# Patient Record
Sex: FEMALE | Race: WHITE | NOT HISPANIC OR LATINO | Employment: OTHER | URBAN - METROPOLITAN AREA
[De-identification: names, ages, dates, MRNs, and addresses within clinical notes are randomized per-mention and may not be internally consistent; named-entity substitution may affect disease eponyms.]

---

## 2023-04-28 ENCOUNTER — APPOINTMENT (OUTPATIENT)
Dept: CT IMAGING | Facility: HOSPITAL | Age: 88
End: 2023-04-28
Payer: MEDICARE

## 2023-04-28 ENCOUNTER — HOSPITAL ENCOUNTER (INPATIENT)
Facility: HOSPITAL | Age: 88
LOS: 4 days | Discharge: HOME | End: 2023-05-03
Attending: EMERGENCY MEDICINE | Admitting: INTERNAL MEDICINE
Payer: MEDICARE

## 2023-04-28 DIAGNOSIS — R10.9 RIGHT SIDED ABDOMINAL PAIN: ICD-10-CM

## 2023-04-28 DIAGNOSIS — E87.6 HYPOKALEMIA: Primary | ICD-10-CM

## 2023-04-28 DIAGNOSIS — D72.829 LEUKOCYTOSIS, UNSPECIFIED TYPE: ICD-10-CM

## 2023-04-28 DIAGNOSIS — R11.2 NAUSEA AND VOMITING, UNSPECIFIED VOMITING TYPE: ICD-10-CM

## 2023-04-28 DIAGNOSIS — I10 PRIMARY HYPERTENSION: ICD-10-CM

## 2023-04-28 LAB
ALBUMIN SERPL-MCNC: 3.6 G/DL (ref 3.5–5.2)
ALBUMIN/GLOB SERPL: 1 G/DL
ALP SERPL-CCNC: 81 U/L (ref 39–117)
ALT SERPL W P-5'-P-CCNC: 18 U/L (ref 1–33)
ANION GAP SERPL CALCULATED.3IONS-SCNC: 11 MMOL/L (ref 5–15)
ANION GAP SERPL CALCULATED.3IONS-SCNC: 13 MMOL/L (ref 5–15)
AST SERPL-CCNC: 21 U/L (ref 1–32)
BACTERIA UR QL AUTO: ABNORMAL /HPF
BASOPHILS # BLD AUTO: 0.02 10*3/MM3 (ref 0–0.2)
BASOPHILS NFR BLD AUTO: 0.1 % (ref 0–1.5)
BILIRUB SERPL-MCNC: 0.7 MG/DL (ref 0–1.2)
BILIRUB UR QL STRIP: NEGATIVE
BUN BLDA-MCNC: 20 MG/DL
BUN BLDA-MCNC: 20 MG/DL (ref 8–26)
BUN SERPL-MCNC: 17 MG/DL (ref 8–23)
BUN SERPL-MCNC: 19 MG/DL (ref 8–23)
BUN/CREAT SERPL: 19.8 (ref 7–25)
BUN/CREAT SERPL: 20.4 (ref 7–25)
CA-I BLDA-SCNC: 1.15 MMOL/L (ref 1.2–1.32)
CALCIUM BLD QL: 1.15 MG/DL
CALCIUM SPEC-SCNC: 8.8 MG/DL (ref 8.2–9.6)
CALCIUM SPEC-SCNC: 8.9 MG/DL (ref 8.2–9.6)
CHLORIDE BLDA-SCNC: 89 MMOL/L (ref 98–109)
CHLORIDE BLDA-SCNC: 89 MMOL/L (ref 98–109)
CHLORIDE SERPL-SCNC: 90 MMOL/L (ref 98–107)
CHLORIDE SERPL-SCNC: 95 MMOL/L (ref 98–107)
CLARITY UR: CLEAR
CO2 BLDA-SCNC: 35 MMOL/L (ref 24–29)
CO2 SERPL-SCNC: 30 MMOL/L (ref 22–29)
CO2 SERPL-SCNC: 30 MMOL/L (ref 22–29)
COLOR UR: YELLOW
CREAT BLDA-MCNC: 0.9 MG/DL
CREAT BLDA-MCNC: 0.9 MG/DL (ref 0.6–1.3)
CREAT SERPL-MCNC: 0.86 MG/DL (ref 0.57–1)
CREAT SERPL-MCNC: 0.93 MG/DL (ref 0.57–1)
D-LACTATE SERPL-SCNC: 1.3 MMOL/L (ref 0.5–2)
DEPRECATED RDW RBC AUTO: 44.6 FL (ref 37–54)
EGFRCR SERPLBLD CKD-EPI 2021: 55.7 ML/MIN/1.73
EGFRCR SERPLBLD CKD-EPI 2021: 57.9 ML/MIN/1.73
EGFRCR SERPLBLD CKD-EPI 2021: 61.1 ML/MIN/1.73
EOSINOPHIL # BLD AUTO: 0.01 10*3/MM3 (ref 0–0.4)
EOSINOPHIL NFR BLD AUTO: 0 % (ref 0.3–6.2)
ERYTHROCYTE [DISTWIDTH] IN BLOOD BY AUTOMATED COUNT: 14.1 % (ref 12.3–15.4)
GLOBULIN UR ELPH-MCNC: 3.5 GM/DL
GLUCOSE BLDC GLUCOMTR-MCNC: 156 MG/DL (ref 70–130)
GLUCOSE BLDC GLUCOMTR-MCNC: 156 MG/DL (ref 70–130)
GLUCOSE SERPL-MCNC: 113 MG/DL (ref 65–99)
GLUCOSE SERPL-MCNC: 149 MG/DL (ref 65–99)
GLUCOSE UR STRIP-MCNC: NEGATIVE MG/DL
HCT VFR BLD AUTO: 37.4 % (ref 34–46.6)
HCT VFR BLDA CALC: 40 % (ref 38–51)
HCT VFR BLDA CALC: 40 % (ref 38–51)
HGB BLD-MCNC: 13.2 G/DL (ref 12–15.9)
HGB BLDA-MCNC: 13.6 G/DL (ref 12–17)
HGB BLDA-MCNC: 13.6 G/DL (ref 12–17)
HGB UR QL STRIP.AUTO: NEGATIVE
HYALINE CASTS UR QL AUTO: ABNORMAL /LPF
IMM GRANULOCYTES # BLD AUTO: 0.1 10*3/MM3 (ref 0–0.05)
IMM GRANULOCYTES NFR BLD AUTO: 0.5 % (ref 0–0.5)
KETONES UR QL STRIP: NEGATIVE
LEUKOCYTE ESTERASE UR QL STRIP.AUTO: NEGATIVE
LIPASE SERPL-CCNC: 16 U/L (ref 13–60)
LYMPHOCYTES # BLD AUTO: 1.05 10*3/MM3 (ref 0.7–3.1)
LYMPHOCYTES NFR BLD AUTO: 5.2 % (ref 19.6–45.3)
MAGNESIUM SERPL-MCNC: 2.2 MG/DL (ref 1.7–2.3)
MCH RBC QN AUTO: 30.5 PG (ref 26.6–33)
MCHC RBC AUTO-ENTMCNC: 35.3 G/DL (ref 31.5–35.7)
MCV RBC AUTO: 86.4 FL (ref 79–97)
MONOCYTES # BLD AUTO: 0.97 10*3/MM3 (ref 0.1–0.9)
MONOCYTES NFR BLD AUTO: 4.8 % (ref 5–12)
NEUTROPHILS NFR BLD AUTO: 18.04 10*3/MM3 (ref 1.7–7)
NEUTROPHILS NFR BLD AUTO: 89.4 % (ref 42.7–76)
NITRITE UR QL STRIP: NEGATIVE
NRBC BLD AUTO-RTO: 0 /100 WBC (ref 0–0.2)
PH UR STRIP.AUTO: 6 [PH] (ref 5–8)
PLATELET # BLD AUTO: 380 10*3/MM3 (ref 140–450)
PMV BLD AUTO: 10 FL (ref 6–12)
POTASSIUM BLDA-SCNC: 2.4 MMOL/L (ref 3.5–4.9)
POTASSIUM BLDA-SCNC: 2.4 MMOL/L (ref 3.5–4.9)
POTASSIUM SERPL-SCNC: 2.6 MMOL/L (ref 3.5–5.2)
POTASSIUM SERPL-SCNC: 3.3 MMOL/L (ref 3.5–5.2)
PROCALCITONIN SERPL-MCNC: 0.15 NG/ML (ref 0–0.25)
PROT SERPL-MCNC: 7.1 G/DL (ref 6–8.5)
PROT UR QL STRIP: ABNORMAL
RBC # BLD AUTO: 4.33 10*6/MM3 (ref 3.77–5.28)
RBC # UR STRIP: ABNORMAL /HPF
REF LAB TEST METHOD: ABNORMAL
SODIUM BLD-SCNC: 133 MMOL/L (ref 138–146)
SODIUM BLD-SCNC: 133 MMOL/L (ref 138–146)
SODIUM SERPL-SCNC: 133 MMOL/L (ref 136–145)
SODIUM SERPL-SCNC: 136 MMOL/L (ref 136–145)
SP GR UR STRIP: 1.02 (ref 1–1.03)
SQUAMOUS #/AREA URNS HPF: ABNORMAL /HPF
UROBILINOGEN UR QL STRIP: ABNORMAL
WBC # UR STRIP: ABNORMAL /HPF
WBC NRBC COR # BLD: 20.19 10*3/MM3 (ref 3.4–10.8)

## 2023-04-28 PROCEDURE — 25010000002 ONDANSETRON PER 1 MG: Performed by: INTERNAL MEDICINE

## 2023-04-28 PROCEDURE — 99285 EMERGENCY DEPT VISIT HI MDM: CPT

## 2023-04-28 PROCEDURE — 80047 BASIC METABLC PNL IONIZED CA: CPT

## 2023-04-28 PROCEDURE — 81001 URINALYSIS AUTO W/SCOPE: CPT | Performed by: EMERGENCY MEDICINE

## 2023-04-28 PROCEDURE — P9612 CATHETERIZE FOR URINE SPEC: HCPCS

## 2023-04-28 PROCEDURE — 83690 ASSAY OF LIPASE: CPT | Performed by: EMERGENCY MEDICINE

## 2023-04-28 PROCEDURE — 74176 CT ABD & PELVIS W/O CONTRAST: CPT

## 2023-04-28 PROCEDURE — 85014 HEMATOCRIT: CPT

## 2023-04-28 PROCEDURE — G0378 HOSPITAL OBSERVATION PER HR: HCPCS

## 2023-04-28 PROCEDURE — 25010000002 HYDRALAZINE PER 20 MG

## 2023-04-28 PROCEDURE — 80053 COMPREHEN METABOLIC PANEL: CPT | Performed by: EMERGENCY MEDICINE

## 2023-04-28 PROCEDURE — 99223 1ST HOSP IP/OBS HIGH 75: CPT | Performed by: INTERNAL MEDICINE

## 2023-04-28 PROCEDURE — 85025 COMPLETE CBC W/AUTO DIFF WBC: CPT | Performed by: EMERGENCY MEDICINE

## 2023-04-28 PROCEDURE — 83735 ASSAY OF MAGNESIUM: CPT | Performed by: EMERGENCY MEDICINE

## 2023-04-28 PROCEDURE — 93005 ELECTROCARDIOGRAM TRACING: CPT | Performed by: EMERGENCY MEDICINE

## 2023-04-28 PROCEDURE — 83605 ASSAY OF LACTIC ACID: CPT | Performed by: EMERGENCY MEDICINE

## 2023-04-28 PROCEDURE — 84145 PROCALCITONIN (PCT): CPT | Performed by: EMERGENCY MEDICINE

## 2023-04-28 RX ORDER — ACETAMINOPHEN 325 MG/1
650 TABLET ORAL EVERY 4 HOURS PRN
Status: DISCONTINUED | OUTPATIENT
Start: 2023-04-28 | End: 2023-05-03 | Stop reason: HOSPADM

## 2023-04-28 RX ORDER — SODIUM CHLORIDE 0.9 % (FLUSH) 0.9 %
10 SYRINGE (ML) INJECTION AS NEEDED
Status: DISCONTINUED | OUTPATIENT
Start: 2023-04-28 | End: 2023-05-03 | Stop reason: HOSPADM

## 2023-04-28 RX ORDER — PAROXETINE 10 MG/1
10 TABLET, FILM COATED ORAL EVERY MORNING
COMMUNITY

## 2023-04-28 RX ORDER — LEVOTHYROXINE SODIUM 0.07 MG/1
75 TABLET ORAL DAILY
COMMUNITY

## 2023-04-28 RX ORDER — SODIUM CHLORIDE 0.9 % (FLUSH) 0.9 %
10 SYRINGE (ML) INJECTION EVERY 12 HOURS SCHEDULED
Status: DISCONTINUED | OUTPATIENT
Start: 2023-04-28 | End: 2023-05-03 | Stop reason: HOSPADM

## 2023-04-28 RX ORDER — LOSARTAN POTASSIUM AND HYDROCHLOROTHIAZIDE 12.5; 1 MG/1; MG/1
1 TABLET ORAL DAILY
COMMUNITY
End: 2023-05-03 | Stop reason: HOSPADM

## 2023-04-28 RX ORDER — SODIUM CHLORIDE 9 MG/ML
40 INJECTION, SOLUTION INTRAVENOUS AS NEEDED
Status: DISCONTINUED | OUTPATIENT
Start: 2023-04-28 | End: 2023-05-03 | Stop reason: HOSPADM

## 2023-04-28 RX ORDER — LATANOPROST 50 UG/ML
1 SOLUTION/ DROPS OPHTHALMIC NIGHTLY
Status: DISCONTINUED | OUTPATIENT
Start: 2023-04-28 | End: 2023-05-03 | Stop reason: HOSPADM

## 2023-04-28 RX ORDER — LATANOPROST 50 UG/ML
1 SOLUTION/ DROPS OPHTHALMIC NIGHTLY
COMMUNITY
End: 2023-05-03 | Stop reason: HOSPADM

## 2023-04-28 RX ORDER — ONDANSETRON 2 MG/ML
4 INJECTION INTRAMUSCULAR; INTRAVENOUS EVERY 6 HOURS PRN
Status: DISCONTINUED | OUTPATIENT
Start: 2023-04-28 | End: 2023-05-03 | Stop reason: HOSPADM

## 2023-04-28 RX ORDER — POTASSIUM CHLORIDE 1.5 G/1.77G
40 POWDER, FOR SOLUTION ORAL EVERY 4 HOURS
Status: DISCONTINUED | OUTPATIENT
Start: 2023-04-29 | End: 2023-04-29

## 2023-04-28 RX ORDER — SODIUM CHLORIDE 9 MG/ML
100 INJECTION, SOLUTION INTRAVENOUS CONTINUOUS
Status: DISCONTINUED | OUTPATIENT
Start: 2023-04-28 | End: 2023-04-29

## 2023-04-28 RX ORDER — LOSARTAN POTASSIUM 50 MG/1
100 TABLET ORAL
Status: DISCONTINUED | OUTPATIENT
Start: 2023-04-29 | End: 2023-04-28

## 2023-04-28 RX ORDER — LOSARTAN POTASSIUM 50 MG/1
100 TABLET ORAL
Status: DISCONTINUED | OUTPATIENT
Start: 2023-04-28 | End: 2023-05-02

## 2023-04-28 RX ORDER — AMLODIPINE BESYLATE 5 MG/1
5 TABLET ORAL DAILY
COMMUNITY
End: 2023-05-03 | Stop reason: HOSPADM

## 2023-04-28 RX ORDER — PAROXETINE 10 MG/1
10 TABLET, FILM COATED ORAL DAILY
Status: DISCONTINUED | OUTPATIENT
Start: 2023-04-29 | End: 2023-05-03 | Stop reason: HOSPADM

## 2023-04-28 RX ORDER — AMLODIPINE BESYLATE 5 MG/1
5 TABLET ORAL DAILY
Status: DISCONTINUED | OUTPATIENT
Start: 2023-04-29 | End: 2023-05-01

## 2023-04-28 RX ORDER — LEVOTHYROXINE SODIUM 0.07 MG/1
75 TABLET ORAL DAILY
Status: DISCONTINUED | OUTPATIENT
Start: 2023-04-29 | End: 2023-05-03 | Stop reason: HOSPADM

## 2023-04-28 RX ORDER — POTASSIUM CHLORIDE 1.5 G/1.77G
40 POWDER, FOR SOLUTION ORAL ONCE
Status: COMPLETED | OUTPATIENT
Start: 2023-04-28 | End: 2023-04-28

## 2023-04-28 RX ORDER — HYDRALAZINE HYDROCHLORIDE 20 MG/ML
10 INJECTION INTRAMUSCULAR; INTRAVENOUS EVERY 6 HOURS PRN
Status: DISCONTINUED | OUTPATIENT
Start: 2023-04-28 | End: 2023-04-29

## 2023-04-28 RX ADMIN — POTASSIUM CHLORIDE 40 MEQ: 1.5 POWDER, FOR SOLUTION ORAL at 17:31

## 2023-04-28 RX ADMIN — ONDANSETRON 4 MG: 2 INJECTION INTRAMUSCULAR; INTRAVENOUS at 22:38

## 2023-04-28 RX ADMIN — SODIUM CHLORIDE 100 ML/HR: 9 INJECTION, SOLUTION INTRAVENOUS at 22:44

## 2023-04-28 RX ADMIN — LATANOPROST 1 DROP: 50 SOLUTION OPHTHALMIC at 23:47

## 2023-04-28 RX ADMIN — HYDRALAZINE HYDROCHLORIDE 10 MG: 20 INJECTION INTRAMUSCULAR; INTRAVENOUS at 23:46

## 2023-04-28 RX ADMIN — SODIUM CHLORIDE, POTASSIUM CHLORIDE, SODIUM LACTATE AND CALCIUM CHLORIDE 1000 ML: 600; 310; 30; 20 INJECTION, SOLUTION INTRAVENOUS at 19:04

## 2023-04-28 RX ADMIN — ACETAMINOPHEN 325MG 650 MG: 325 TABLET ORAL at 22:38

## 2023-04-28 RX ADMIN — POTASSIUM CHLORIDE 40 MEQ: 1.5 POWDER, FOR SOLUTION ORAL at 23:47

## 2023-04-28 RX ADMIN — Medication 10 ML: at 22:45

## 2023-04-28 RX ADMIN — LOSARTAN POTASSIUM 100 MG: 50 TABLET, FILM COATED ORAL at 22:38

## 2023-04-28 NOTE — ED PROVIDER NOTES
Subjective   History of Present Illness    Pt presents after referral from PCP for abnormal labs.  She was seen this morning for a 2-3 day history of fatigue, right sided abdominal pain, nausea.  She has vomited a couple of times but family says not a lot.  No fever or urinary complaints.  This morning they saw PCP at the [facility] and were told she had elevated WBC and low potassium and they were sent in.  Pt says overall she just feels terrible.    PMH HTN, thyroid disease, GERD, fibromyalgia.  Her HTN med does include HCTZ 12.5 mg.  She says she has been told she has some sort of abdominal mass but they don't know any details.    History provided by:  Patient and relative      Review of Systems   Constitutional: Negative for fever.   Respiratory: Negative for shortness of breath.    Cardiovascular: Negative for chest pain.   Gastrointestinal: Positive for abdominal pain, nausea and vomiting. Negative for diarrhea.   Genitourinary: Negative for difficulty urinating and dysuria.   All other systems reviewed and are negative.      Past Medical History:   Diagnosis Date   • Breast cancer    • Fibromyalgia    • Hypertension        Allergies   Allergen Reactions   • Codeine GI Intolerance       History reviewed. No pertinent surgical history.    History reviewed. No pertinent family history.    Social History     Socioeconomic History   • Marital status:    Tobacco Use   • Smoking status: Never   Vaping Use   • Vaping Use: Never used   Substance and Sexual Activity   • Alcohol use: Never   • Drug use: Never   • Sexual activity: Not Currently           Objective   Physical Exam  Vitals and nursing note reviewed.   Constitutional:       General: She is not in acute distress.     Appearance: Normal appearance. She is not ill-appearing.   HENT:      Head: Normocephalic and atraumatic.      Mouth/Throat:      Mouth: Mucous membranes are moist.   Eyes:      General: No scleral icterus.        Right eye: No  discharge.         Left eye: No discharge.      Conjunctiva/sclera: Conjunctivae normal.   Cardiovascular:      Rate and Rhythm: Normal rate and regular rhythm.      Heart sounds: No murmur heard.  Pulmonary:      Effort: Pulmonary effort is normal. No respiratory distress.      Breath sounds: Normal breath sounds. No wheezing.   Abdominal:      General: Bowel sounds are normal. There is no distension.      Palpations: Abdomen is soft.      Tenderness: There is abdominal tenderness (diffuse). There is no guarding or rebound.   Musculoskeletal:         General: No swelling. Normal range of motion.      Cervical back: Normal range of motion and neck supple.   Skin:     General: Skin is warm and dry.      Findings: No rash.   Neurological:      General: No focal deficit present.      Mental Status: She is alert and oriented to person, place, and time. Mental status is at baseline.   Psychiatric:         Mood and Affect: Mood normal.         Behavior: Behavior normal.         Thought Content: Thought content normal.         Procedures           ED Course  ED Course as of 04/28/23 2028 Fri Apr 28, 2023   1610 EKG NSR, LAD, RBBB with QTc 482 [BW]   1615 I have no prior EKG for comparison [BW]   1615 Awaiting magnesium result and then replacement will be ordered as indicated. [BW]   1650 Home meds do include HCTZ [BW]      ED Course User Index  [BW] Miguel Plaza MD         Hypokalemia 2.6 here, normal magnesium.  Replacement ordered. WBC up.  Labs otherwise benign, sepsis markers are negative.    CT concerning for pancreatitis but her lipase is negative.    UA was hard to obtain and delayed her evaluation/care.  It was negative.  There is no obvious explanation for her leukocytosis, certainly stress reaction could be cause but in a 89 yr old index of suspicion for badness needs to be high.    Electrolyte replacement protocol ordered.      She feels better after meds and fluids.    Patient stable on serial  rechecks.  Discussed exam findings, test results so far and concerns in detail at the bedside.  Discussed need for admission for further evaluation and treatment.                                    Medical Decision Making  Hypokalemia: complicated acute illness or injury that poses a threat to life or bodily functions  Leukocytosis, unspecified type: undiagnosed new problem with uncertain prognosis  Nausea and vomiting, unspecified vomiting type: acute illness or injury  Right sided abdominal pain: acute illness or injury  Amount and/or Complexity of Data Reviewed  Independent Historian: caregiver  Labs: ordered. Decision-making details documented in ED Course.  Radiology: ordered. Decision-making details documented in ED Course.  ECG/medicine tests: ordered and independent interpretation performed. Decision-making details documented in ED Course.      Risk  OTC drugs.  Prescription drug management.  Decision regarding hospitalization.          Final diagnoses:   Hypokalemia   Leukocytosis, unspecified type   Right sided abdominal pain   Nausea and vomiting, unspecified vomiting type       ED Disposition  ED Disposition     ED Disposition   Decision to Admit    Condition   --    Comment   --             No follow-up provider specified.       Medication List      No changes were made to your prescriptions during this visit.          Miguel Plaza MD  04/28/23 2028

## 2023-04-28 NOTE — Clinical Note
Level of Care: Telemetry [5]   Diagnosis: Hypokalemia [489182]   Admitting Physician: FREDERIC MATHEWS III [[id number]   Attending Physician: FREDERIC MATHEWS III [[id number]   Bed Request Comments: obs tele

## 2023-04-29 ENCOUNTER — APPOINTMENT (OUTPATIENT)
Dept: GENERAL RADIOLOGY | Facility: HOSPITAL | Age: 88
End: 2023-04-29
Payer: MEDICARE

## 2023-04-29 LAB
ALBUMIN SERPL-MCNC: 3.2 G/DL (ref 3.5–5.2)
ALBUMIN/GLOB SERPL: 1 G/DL
ALP SERPL-CCNC: 83 U/L (ref 39–117)
ALT SERPL W P-5'-P-CCNC: 11 U/L (ref 1–33)
ANION GAP SERPL CALCULATED.3IONS-SCNC: 11 MMOL/L (ref 5–15)
AST SERPL-CCNC: 12 U/L (ref 1–32)
BASOPHILS # BLD AUTO: 0.04 10*3/MM3 (ref 0–0.2)
BASOPHILS NFR BLD AUTO: 0.2 % (ref 0–1.5)
BILIRUB SERPL-MCNC: 0.8 MG/DL (ref 0–1.2)
BUN SERPL-MCNC: 15 MG/DL (ref 8–23)
BUN/CREAT SERPL: 20.8 (ref 7–25)
CALCIUM SPEC-SCNC: 8.4 MG/DL (ref 8.2–9.6)
CHLORIDE SERPL-SCNC: 98 MMOL/L (ref 98–107)
CO2 SERPL-SCNC: 27 MMOL/L (ref 22–29)
CREAT SERPL-MCNC: 0.72 MG/DL (ref 0.57–1)
DEPRECATED RDW RBC AUTO: 47.4 FL (ref 37–54)
EGFRCR SERPLBLD CKD-EPI 2021: 75.7 ML/MIN/1.73
EOSINOPHIL # BLD AUTO: 0.03 10*3/MM3 (ref 0–0.4)
EOSINOPHIL NFR BLD AUTO: 0.2 % (ref 0.3–6.2)
ERYTHROCYTE [DISTWIDTH] IN BLOOD BY AUTOMATED COUNT: 14.5 % (ref 12.3–15.4)
GLOBULIN UR ELPH-MCNC: 3.2 GM/DL
GLUCOSE SERPL-MCNC: 108 MG/DL (ref 65–99)
HCT VFR BLD AUTO: 36.3 % (ref 34–46.6)
HGB BLD-MCNC: 12.3 G/DL (ref 12–15.9)
IMM GRANULOCYTES # BLD AUTO: 0.13 10*3/MM3 (ref 0–0.05)
IMM GRANULOCYTES NFR BLD AUTO: 0.7 % (ref 0–0.5)
LYMPHOCYTES # BLD AUTO: 1.03 10*3/MM3 (ref 0.7–3.1)
LYMPHOCYTES NFR BLD AUTO: 5.8 % (ref 19.6–45.3)
MAGNESIUM SERPL-MCNC: 2.1 MG/DL (ref 1.7–2.3)
MCH RBC QN AUTO: 30.4 PG (ref 26.6–33)
MCHC RBC AUTO-ENTMCNC: 33.9 G/DL (ref 31.5–35.7)
MCV RBC AUTO: 89.9 FL (ref 79–97)
MONOCYTES # BLD AUTO: 1.37 10*3/MM3 (ref 0.1–0.9)
MONOCYTES NFR BLD AUTO: 7.7 % (ref 5–12)
NEUTROPHILS NFR BLD AUTO: 15.3 10*3/MM3 (ref 1.7–7)
NEUTROPHILS NFR BLD AUTO: 85.4 % (ref 42.7–76)
NRBC BLD AUTO-RTO: 0 /100 WBC (ref 0–0.2)
PLATELET # BLD AUTO: 330 10*3/MM3 (ref 140–450)
PMV BLD AUTO: 10 FL (ref 6–12)
POTASSIUM SERPL-SCNC: 3 MMOL/L (ref 3.5–5.2)
POTASSIUM SERPL-SCNC: 3.5 MMOL/L (ref 3.5–5.2)
POTASSIUM SERPL-SCNC: 3.6 MMOL/L (ref 3.5–5.2)
PROT SERPL-MCNC: 6.4 G/DL (ref 6–8.5)
RBC # BLD AUTO: 4.04 10*6/MM3 (ref 3.77–5.28)
SODIUM SERPL-SCNC: 136 MMOL/L (ref 136–145)
WBC NRBC COR # BLD: 17.9 10*3/MM3 (ref 3.4–10.8)

## 2023-04-29 PROCEDURE — 85025 COMPLETE CBC W/AUTO DIFF WBC: CPT | Performed by: INTERNAL MEDICINE

## 2023-04-29 PROCEDURE — 84132 ASSAY OF SERUM POTASSIUM: CPT

## 2023-04-29 PROCEDURE — 83735 ASSAY OF MAGNESIUM: CPT | Performed by: INTERNAL MEDICINE

## 2023-04-29 PROCEDURE — 84132 ASSAY OF SERUM POTASSIUM: CPT | Performed by: INTERNAL MEDICINE

## 2023-04-29 PROCEDURE — 71045 X-RAY EXAM CHEST 1 VIEW: CPT

## 2023-04-29 PROCEDURE — 99233 SBSQ HOSP IP/OBS HIGH 50: CPT | Performed by: INTERNAL MEDICINE

## 2023-04-29 PROCEDURE — 80053 COMPREHEN METABOLIC PANEL: CPT | Performed by: INTERNAL MEDICINE

## 2023-04-29 PROCEDURE — 25010000002 FUROSEMIDE PER 20 MG: Performed by: INTERNAL MEDICINE

## 2023-04-29 PROCEDURE — 0 POTASSIUM CHLORIDE 10 MEQ/100ML SOLUTION: Performed by: INTERNAL MEDICINE

## 2023-04-29 RX ORDER — POTASSIUM CHLORIDE 1.5 G/1.77G
40 POWDER, FOR SOLUTION ORAL ONCE
Status: COMPLETED | OUTPATIENT
Start: 2023-04-29 | End: 2023-04-29

## 2023-04-29 RX ORDER — POTASSIUM CHLORIDE 7.45 MG/ML
10 INJECTION INTRAVENOUS
Status: DISPENSED | OUTPATIENT
Start: 2023-04-29 | End: 2023-04-29

## 2023-04-29 RX ORDER — POTASSIUM CHLORIDE 1.5 G/1.77G
40 POWDER, FOR SOLUTION ORAL EVERY 4 HOURS
Status: COMPLETED | OUTPATIENT
Start: 2023-04-29 | End: 2023-04-29

## 2023-04-29 RX ORDER — FUROSEMIDE 10 MG/ML
20 INJECTION INTRAMUSCULAR; INTRAVENOUS ONCE
Status: COMPLETED | OUTPATIENT
Start: 2023-04-29 | End: 2023-04-29

## 2023-04-29 RX ORDER — CLONIDINE 0.3 MG/24H
1 PATCH, EXTENDED RELEASE TRANSDERMAL WEEKLY
Status: DISCONTINUED | OUTPATIENT
Start: 2023-04-29 | End: 2023-05-01

## 2023-04-29 RX ORDER — LABETALOL HYDROCHLORIDE 5 MG/ML
10 INJECTION, SOLUTION INTRAVENOUS ONCE
Status: COMPLETED | OUTPATIENT
Start: 2023-04-30 | End: 2023-04-29

## 2023-04-29 RX ORDER — ENALAPRILAT 2.5 MG/2ML
1.25 INJECTION INTRAVENOUS EVERY 6 HOURS PRN
Status: DISCONTINUED | OUTPATIENT
Start: 2023-04-29 | End: 2023-04-30

## 2023-04-29 RX ADMIN — LEVOTHYROXINE SODIUM 75 MCG: 75 TABLET ORAL at 08:14

## 2023-04-29 RX ADMIN — Medication 10 ML: at 20:39

## 2023-04-29 RX ADMIN — LABETALOL HYDROCHLORIDE 10 MG: 5 INJECTION, SOLUTION INTRAVENOUS at 23:50

## 2023-04-29 RX ADMIN — AMLODIPINE BESYLATE 5 MG: 5 TABLET ORAL at 08:14

## 2023-04-29 RX ADMIN — LATANOPROST 1 DROP: 50 SOLUTION OPHTHALMIC at 20:39

## 2023-04-29 RX ADMIN — POTASSIUM CHLORIDE 40 MEQ: 1.5 POWDER, FOR SOLUTION ORAL at 14:05

## 2023-04-29 RX ADMIN — PAROXETINE HYDROCHLORIDE 10 MG: 20 TABLET, FILM COATED ORAL at 08:14

## 2023-04-29 RX ADMIN — POTASSIUM CHLORIDE 40 MEQ: 1.5 POWDER, FOR SOLUTION ORAL at 18:19

## 2023-04-29 RX ADMIN — FUROSEMIDE 20 MG: 10 INJECTION, SOLUTION INTRAMUSCULAR; INTRAVENOUS at 12:40

## 2023-04-29 RX ADMIN — CLONIDINE 1 PATCH: 0.3 PATCH, EXTENDED RELEASE TRANSDERMAL at 14:05

## 2023-04-29 RX ADMIN — ENALAPRILAT 1.25 MG: 1.25 INJECTION INTRAVENOUS at 20:39

## 2023-04-29 RX ADMIN — ENALAPRILAT 1.25 MG: 1.25 INJECTION INTRAVENOUS at 09:44

## 2023-04-29 RX ADMIN — POTASSIUM CHLORIDE 40 MEQ: 1.5 POWDER, FOR SOLUTION ORAL at 06:29

## 2023-04-29 RX ADMIN — ENALAPRILAT 1.25 MG: 1.25 INJECTION INTRAVENOUS at 00:50

## 2023-04-29 RX ADMIN — Medication 10 ML: at 08:28

## 2023-04-29 NOTE — NURSING NOTE
NSR on monitor. 2L NC. SBP elevated throughout shift, MD aware.  PRN vasotec administered. lasix and clonidine patch administered as prescribed. current /89.

## 2023-04-29 NOTE — PROGRESS NOTES
[facility] Medicine Services  PROGRESS NOTE    Patient Name: Shayna Paula  : 1924  MRN: [id number]    Date of Admission: 2023  Primary Care Physician: Riky Sue DO    Subjective   Subjective     CC:  Weakness    HPI:  Resting in bed in no acute distress but complains of weakness.  No fever or chills.  No chest pain or palpitation.  No nausea vomiting or diarrhea.  No headache.    ROS:       Objective   Objective     Vital Signs:   Temp:  [97.7 °F (36.5 °C)-98.4 °F (36.9 °C)] 98.1 °F (36.7 °C)  Heart Rate:  [] 87  Resp:  [16-20] 16  BP: (149-215)/() 169/89  Flow (L/min):  [2] 2     Physical Exam:  Constitutional: No acute distresst  HENT: NCAT, mucous membranes moist  Respiratory: Clear to auscultation bilaterally, respiratory effort normal   Cardiovascular: RRR, no murmurs, rubs, or gallops  Gastrointestinal: Positive bowel sounds, soft, nontender, nondistended  Musculoskeletal: No bilateral ankle edema  Psychiatric: Appropriate affect, cooperative  Neurologic: Awake, alert, oriented x3, speech clear  Skin: No rashes    Results Reviewed:  LAB RESULTS:      Lab 23  0508 23  1600 23  1551 23  1549   WBC 17.90*  --   --  20.19*   HEMOGLOBIN 12.3  --   --  13.2   HEMOGLOBIN, POC  --  13.6 13.6  --    HEMATOCRIT 36.3  --   --  37.4   HEMATOCRIT POC  --  40 40  --    PLATELETS 330  --   --  380   NEUTROS ABS 15.30*  --   --  18.04*   IMMATURE GRANS (ABS) 0.13*  --   --  0.10*   LYMPHS ABS 1.03  --   --  1.05   MONOS ABS 1.37*  --   --  0.97*   EOS ABS 0.03  --   --  0.01   MCV 89.9  --   --  86.4   PROCALCITONIN  --   --   --  0.15   LACTATE  --   --   --  1.3         Lab 23  1216 23  0508 23  2231 23  1600 23  1551 23  1549   SODIUM  --  136 136  --   --  133*   POTASSIUM 3.5 3.0* 3.3*  --   --  2.6*   CHLORIDE  --  98 95*  --   --  90*   CO2  --  27.0 30.0*  --   --  30.0*   ANION GAP  --   11.0 11.0  --   --  13.0   BUN  --  15 17  --   --  19   CREATININE  --  0.72 0.86 0.90 0.90 0.93   EGFR  --  75.7 61.1  --  57.9* 55.7*   GLUCOSE  --  108* 113*  --   --  149*   CALCIUM  --  8.4 8.8  --   --  8.9   MAGNESIUM  --  2.1  --   --   --  2.2         Lab 04/29/23  0508 04/28/23  1549   TOTAL PROTEIN 6.4 7.1   ALBUMIN 3.2* 3.6   GLOBULIN 3.2 3.5   ALT (SGPT) 11 18   AST (SGOT) 12 21   BILIRUBIN 0.8 0.7   ALK PHOS 83 81   LIPASE  --  16                     Brief Urine Lab Results  (Last result in the past 365 days)      Color   Clarity   Blood   Leuk Est   Nitrite   Protein   CREAT   Urine HCG        04/28/23 1938 Yellow   Clear   Negative   Negative   Negative   100 mg/dL (2+)                 Microbiology Results Abnormal     None          CT Abdomen Pelvis Without Contrast    Result Date: 4/28/2023  CT ABDOMEN PELVIS WO CONTRAST Date of Exam: 4/28/2023 5:52 PM EDT Indication: right side abd pain, vomiting, may have a mass. Comparison: None available. Technique: Axial CT images were obtained of the abdomen and pelvis without the administration of contrast. Reconstructed coronal and sagittal images were also obtained. Automated exposure control and iterative construction methods were used. Findings: The lung bases are grossly clear. Unremarkable appearance of the liver, gallbladder, bile ducts, spleen. There is hazy peripancreatic fat stranding with otherwise unremarkable appearance of the pancreas; there is thin nonorganized peripancreatic fluid along the inferior margin of the tail (series 901 image 88). Normal appearance of the adrenal glands, kidneys, ureters, and bladder. Fibroid uterus. There are small bilateral ovarian cysts measuring up to 2.8 cm on the right. There is severe sigmoid colonic diverticulosis without evidence of diverticulitis. Normal appendix and terminal ileum. No evidence of bowel obstruction. There is mild fluid distention of the stomach with fluid likely in the distal esophagus.  No abdominopelvic free fluid. No pneumoperitoneum. There is atherosclerosis of the abdominal aorta without evidence of aneurysm. No lymphadenopathy. The body wall soft tissues are unremarkable. The bones are demineralized without acute or suspicious bony findings.     Impression: Impression: Hazy peripancreatic fat stranding and thin nonorganized peripancreatic fluid, findings compatible with acute interstitial pancreatitis and correlate with lipase. Mild fluid distention of the stomach with some fluid in the distal esophagus which predisposes the patient to aspiration. Fibroid uterus. Sigmoid colonic diverticulosis without diverticulitis. Electronically Signed: Joaquin Rubalcava  4/28/2023 6:14 PM EDT  Workstation ID: [id number]    XR Chest 1 View    Result Date: 4/29/2023  EXAM:  XR CHEST 1 VW   DATE: 4/29/2023 3:45 AM HISTORY:   Shortness of air COMPARISON:  None. FINDINGS and     Impression: 1.  Low lung volumes. 2.  Linear opacity right midlung (scarring, atelectasis) 3.  Question right lung base opacity versus elevated diaphragm. 4.  Heart size is normal. 5.  No acute bony findings. Electronically signed by:  Kathrine Calderon M.D.  4/29/2023 3:03 AM Mountain Time          Current medications:  Scheduled Meds:amLODIPine, 5 mg, Oral, Daily  cloNIDine, 1 patch, Transdermal, Weekly  latanoprost, 1 drop, Both Eyes, Nightly  levothyroxine, 75 mcg, Oral, Daily  losartan, 100 mg, Oral, Q24H  PARoxetine, 10 mg, Oral, Daily  potassium chloride, 40 mEq, Oral, Q4H  sodium chloride, 10 mL, Intravenous, Q12H      Continuous Infusions:   PRN Meds:.•  acetaminophen  •  enalaprilat  •  Magnesium Standard Dose Replacement - Follow Nurse / BPA Driven Protocol  •  ondansetron  •  Potassium Replacement - Follow Nurse / BPA Driven Protocol  •  sodium chloride  •  sodium chloride    Assessment & Plan   Assessment & Plan     Active Hospital Problems    Diagnosis  POA   • **Hypokalemia [E87.6]  Yes      Resolved Hospital Problems   No  resolved problems to display.        Brief Hospital Course to date:  Shayna Paula is a 89 y.o. female with past medical history significant for hypertension, glucoma, history of breast cancer, hypothyroidism, GERD.  Patient was admitted for progressive generalized weakness and hypokalemia.    *Hypokalemia  - Replace per protocol and monitor.     Hypertension  - home losartan and Norvasc was continued.  However, patient still has elevated blood pressure.  We started patient on clonidine patch.  We will continue monitoring.  - We will also continue on hydrochlorothiazide when appropriate.     Hypothyroidism  - Continue Synthroid from home regimen.     History of breast cancer  - No acute issues; treatment for this is complete.     Glaucoma  - Continue home Xalatan drops.     GERD  - We will add daily oral Pepcid.      Expected Discharge Location and Transportation: Home  Expected Discharge 2 to 3 days  No data recorded     DVT prophylaxis:  Mechanical DVT prophylaxis orders are present.          CODE STATUS:   Code Status and Medical Interventions:   Ordered at: 04/28/23 1564     Level Of Support Discussed With:    Patient     Code Status (Patient has no pulse and is not breathing):    No CPR (Do Not Attempt to Resuscitate)     Medical Interventions (Patient has pulse or is breathing):    Full Support     Comments:    DNR/DNI in the event of respiratory and/or cardiac arrest.       Codey Arguelles MD  04/29/23

## 2023-04-29 NOTE — H&P
[facility] Medicine Services  HISTORY AND PHYSICAL    Patient Name: Shayna Paula  : 1924  MRN: [id number]  Primary Care Physician: Riky Sue DO  Date of admission: 2023      Subjective   Subjective     Chief Complaint:  Weakness, fatigue    HPI:  Shayna Paula is a 89 y.o. female who states that for the last 3 days she has noticed increased lethargy and weakness, also increased fatigue.  Yesterday she had 2 bouts of nausea with emesis.  The patient's daughter-in-law is with her in the ED room during my visit and she states also that the patient has had such fatigue that she is less interactive over the last 3 days, though there was some improvement today; she states the patient seems much better after treatment in the ED.  The patient went to see her PCP today who akhil routine labs which returned a white count of 20 and potassium of 2.3.  She was instructed to come to the ED for further evaluation.  Her labs demonstrated continued hypokalemia and she received oral replacement here.  She denies any muscle spasms, palpitations, chest pain, shortness of breath, fever/chills, or syncope.  Her medical history is significant for hypertension, glaucoma, breast cancer (treatment complete), GERD, and hypothyroidism.        Review of Systems   Constitutional: Positive for fatigue.   HENT: Negative.    Eyes: Negative.    Respiratory: Negative.    Cardiovascular: Negative.    Gastrointestinal: Positive for nausea and vomiting.   Endocrine: Negative.    Genitourinary: Negative.    Musculoskeletal: Negative.    Skin: Negative.    Allergic/Immunologic: Negative.    Neurological: Positive for weakness.   Hematological: Negative.    Psychiatric/Behavioral:        As in HPI          Personal History     Past Medical History:   Diagnosis Date   • Breast cancer    • Fibromyalgia    • Hypertension              History reviewed. No pertinent surgical history.    Family History:  Mother had dementia, father  of cancer.    Social History:  reports that she has never smoked. She does not have any smokeless tobacco history on file. She reports that she does not drink alcohol and does not use drugs.  Social History     Social History Narrative   • Not on file       Medications:  Available home medication information reviewed.  (Not in a hospital admission)      Allergies   Allergen Reactions   • Codeine GI Intolerance       Objective   Objective     Vital Signs:   Temp:  [98.7 °F (37.1 °C)] 98.7 °F (37.1 °C)  Heart Rate:  [73-79] 75  Resp:  [22] 22  BP: (139-203)/() 203/94  Flow (L/min):  [2] 2       Physical Exam   Constitutional: Awake, alert  Eyes: PERRLA, sclerae anicteric, no conjunctival injection  HENT: NCAT, mucous membranes moist  Neck: Supple, no thyromegaly, no lymphadenopathy, trachea midline  Respiratory: Clear to auscultation bilaterally, nonlabored respirations   Cardiovascular: RRR, no murmurs, rubs, or gallops, palpable pedal pulses bilaterally  Gastrointestinal: Positive bowel sounds, soft, nontender, nondistended  Musculoskeletal: No bilateral ankle edema, no clubbing or cyanosis to extremities  Psychiatric: Appropriate affect, cooperative  Neurologic: Oriented x 3, strength symmetric in all extremities, Cranial Nerves grossly intact to confrontation, speech clear  Skin: No rashes        Result Review:  I have personally reviewed the results from the time of this admission to 2023 21:56 EDT and agree with these findings:  [x]  Laboratory list / accordion  []  Microbiology  [x]  Radiology  [x]  EKG/Telemetry   []  Cardiology/Vascular   []  Pathology  []  Old records  []  Other:  Most notable findings include: Reviewed CT abdomen/pelvis radiology read.  Reviewed EKG which by my read shows normal sinus rhythm, ventricular rate approximately 70 bpm,  left axis, nonspecific ST/T wave changes.        LAB RESULTS:      Lab 23  1600 23  1551 23  1549    WBC  --   --  20.19*   HEMOGLOBIN  --   --  13.2   HEMOGLOBIN, POC 13.6 13.6  --    HEMATOCRIT  --   --  37.4   HEMATOCRIT POC 40 40  --    PLATELETS  --   --  380   NEUTROS ABS  --   --  18.04*   IMMATURE GRANS (ABS)  --   --  0.10*   LYMPHS ABS  --   --  1.05   MONOS ABS  --   --  0.97*   EOS ABS  --   --  0.01   MCV  --   --  86.4   PROCALCITONIN  --   --  0.15   LACTATE  --   --  1.3         Lab 04/28/23  1600 04/28/23  1551 04/28/23  1549   SODIUM  --   --  133*   POTASSIUM  --   --  2.6*   CHLORIDE  --   --  90*   CO2  --   --  30.0*   ANION GAP  --   --  13.0   BUN  --   --  19   CREATININE 0.90 0.90 0.93   EGFR  --  57.9* 55.7*   GLUCOSE  --   --  149*   CALCIUM  --   --  8.9   MAGNESIUM  --   --  2.2         Lab 04/28/23  1549   TOTAL PROTEIN 7.1   ALBUMIN 3.6   GLOBULIN 3.5   ALT (SGPT) 18   AST (SGOT) 21   BILIRUBIN 0.7   ALK PHOS 81   LIPASE 16                     UA        4/28/2023    19:38   Urinalysis   Squamous Epithelial Cells, UA 3-6     Specific Gravity, UA 1.021     Ketones, UA Negative     Blood, UA Negative     Leukocytes, UA Negative     Nitrite, UA Negative     RBC, UA 0-2     WBC, UA 3-5     Bacteria, UA 1+         Microbiology Results (last 10 days)     ** No results found for the last 240 hours. **          CT Abdomen Pelvis Without Contrast    Result Date: 4/28/2023  CT ABDOMEN PELVIS WO CONTRAST Date of Exam: 4/28/2023 5:52 PM EDT Indication: right side abd pain, vomiting, may have a mass. Comparison: None available. Technique: Axial CT images were obtained of the abdomen and pelvis without the administration of contrast. Reconstructed coronal and sagittal images were also obtained. Automated exposure control and iterative construction methods were used. Findings: The lung bases are grossly clear. Unremarkable appearance of the liver, gallbladder, bile ducts, spleen. There is hazy peripancreatic fat stranding with otherwise unremarkable appearance of the pancreas; there is thin  nonorganized peripancreatic fluid along the inferior margin of the tail (series 901 image 88). Normal appearance of the adrenal glands, kidneys, ureters, and bladder. Fibroid uterus. There are small bilateral ovarian cysts measuring up to 2.8 cm on the right. There is severe sigmoid colonic diverticulosis without evidence of diverticulitis. Normal appendix and terminal ileum. No evidence of bowel obstruction. There is mild fluid distention of the stomach with fluid likely in the distal esophagus. No abdominopelvic free fluid. No pneumoperitoneum. There is atherosclerosis of the abdominal aorta without evidence of aneurysm. No lymphadenopathy. The body wall soft tissues are unremarkable. The bones are demineralized without acute or suspicious bony findings.     Impression: Impression: Hazy peripancreatic fat stranding and thin nonorganized peripancreatic fluid, findings compatible with acute interstitial pancreatitis and correlate with lipase. Mild fluid distention of the stomach with some fluid in the distal esophagus which predisposes the patient to aspiration. Fibroid uterus. Sigmoid colonic diverticulosis without diverticulitis. Electronically Signed: Joaquin Rubalcava  4/28/2023 6:14 PM EDT  Workstation ID: [id number]          Assessment & Plan   Assessment & Plan     Active Hospital Problems    Diagnosis  POA   • **Hypokalemia [E87.6]  Yes       89F with hypokalemia, generalized weakness, fatigue    Hypokalemia  - This was addressed in the ED with oral replacement.  - She is on electrolyte replacement protocol.  - We will recheck potassium level later on tonight.  - Continue telemetry monitoring.  - Hold HCTZ from home regimen.    Hypertension  - Continue home losartan from home regimen.  - Hold HCTZ while receiving IV fluids.  - Continue Norvasc for management.    Hypothyroidism  - Continue Synthroid from home regimen.  - Check TSH, free T4.    History of breast cancer  - No acute issues; treatment for this is  complete.    Glaucoma  - Continue home Xalatan drops.    GERD  - We will add daily oral Pepcid.      Total time spent: 79 minutes  Time spent includes time reviewing chart, face-to-face time, counseling patient/family/caregiver, ordering medications/tests/procedures, communicating with other health care professionals, documenting clinical information in the electronic health record, and coordination of care.    DVT prophylaxis:  scds      CODE STATUS:  full  Code Status and Medical Interventions:   Ordered at: 04/28/23 2029     Level Of Support Discussed With:    Patient     Code Status (Patient has no pulse and is not breathing):    CPR (Attempt to Resuscitate)     Medical Interventions (Patient has pulse or is breathing):    Full Support       Expected Discharge tbd  No data recorded     Raman Ordoñez III, DO  04/28/23

## 2023-04-30 LAB — POTASSIUM SERPL-SCNC: 3.5 MMOL/L (ref 3.5–5.2)

## 2023-04-30 PROCEDURE — 99232 SBSQ HOSP IP/OBS MODERATE 35: CPT | Performed by: INTERNAL MEDICINE

## 2023-04-30 PROCEDURE — 84132 ASSAY OF SERUM POTASSIUM: CPT | Performed by: INTERNAL MEDICINE

## 2023-04-30 RX ORDER — CLONIDINE HYDROCHLORIDE 0.1 MG/1
0.1 TABLET ORAL ONCE
Status: COMPLETED | OUTPATIENT
Start: 2023-04-30 | End: 2023-04-30

## 2023-04-30 RX ORDER — POTASSIUM CHLORIDE 1.5 G/1.77G
40 POWDER, FOR SOLUTION ORAL EVERY 4 HOURS
Status: DISPENSED | OUTPATIENT
Start: 2023-04-30 | End: 2023-05-01

## 2023-04-30 RX ORDER — LOSARTAN POTASSIUM 50 MG/1
100 TABLET ORAL ONCE
Status: COMPLETED | OUTPATIENT
Start: 2023-04-30 | End: 2023-04-30

## 2023-04-30 RX ORDER — POTASSIUM CHLORIDE 1.5 G/1.77G
40 POWDER, FOR SOLUTION ORAL EVERY 4 HOURS
Status: COMPLETED | OUTPATIENT
Start: 2023-04-30 | End: 2023-04-30

## 2023-04-30 RX ADMIN — Medication 10 ML: at 22:30

## 2023-04-30 RX ADMIN — CLONIDINE HYDROCHLORIDE 0.1 MG: 0.1 TABLET ORAL at 00:39

## 2023-04-30 RX ADMIN — Medication 10 ML: at 08:53

## 2023-04-30 RX ADMIN — POTASSIUM CHLORIDE 40 MEQ: 1.5 POWDER, FOR SOLUTION ORAL at 06:35

## 2023-04-30 RX ADMIN — LEVOTHYROXINE SODIUM 75 MCG: 75 TABLET ORAL at 08:53

## 2023-04-30 RX ADMIN — PAROXETINE HYDROCHLORIDE 10 MG: 20 TABLET, FILM COATED ORAL at 08:53

## 2023-04-30 RX ADMIN — POTASSIUM CHLORIDE 40 MEQ: 1.5 POWDER, FOR SOLUTION ORAL at 21:00

## 2023-04-30 RX ADMIN — ACETAMINOPHEN 325MG 650 MG: 325 TABLET ORAL at 00:39

## 2023-04-30 RX ADMIN — POTASSIUM CHLORIDE 40 MEQ: 1.5 POWDER, FOR SOLUTION ORAL at 10:49

## 2023-04-30 RX ADMIN — LATANOPROST 1 DROP: 50 SOLUTION OPHTHALMIC at 22:30

## 2023-04-30 RX ADMIN — LOSARTAN POTASSIUM 100 MG: 50 TABLET, FILM COATED ORAL at 00:39

## 2023-04-30 NOTE — NURSING NOTE
Pt blood pressures consistently elevated, Vasotec administered early in shift. Pt BP rising to 190s-200s, IV labetalol ordered. IV infiltrated before it could be administered. Charge RN attempted US guided IV, unable to obtain access as patient refused further pokes. Lungs sounds crackly at the bases, Hospitalist aware, PO clonidine and losartan x1 ordered.

## 2023-04-30 NOTE — PROGRESS NOTES
[facility] Medicine Services  PROGRESS NOTE    Patient Name: Shayna Paula  : 1924  MRN: [id number]    Date of Admission: 2023  Primary Care Physician: Riky Sue DO    Subjective   Subjective     CC:  Weakness    HPI:  Resting in bed in no acute distress but is very drowsy, however, easily arousable.  No fever or chills.  No chest pain or palpitation.  No nausea vomiting or diarrhea.  No headache.    ROS:       Objective   Objective     Vital Signs:   Temp:  [97.5 °F (36.4 °C)-98.3 °F (36.8 °C)] 97.6 °F (36.4 °C)  Heart Rate:  [] 65  Resp:  [16-18] 18  BP: (108-203)/() 149/86  Flow (L/min):  [2] 2     Physical Exam:  Constitutional: No acute distresst  HENT: NCAT, mucous membranes moist  Respiratory: Clear to auscultation bilaterally, respiratory effort normal   Cardiovascular: RRR, no murmurs, rubs, or gallops  Gastrointestinal: Positive bowel sounds, soft, nontender, nondistended  Musculoskeletal: No bilateral ankle edema  Psychiatric: Appropriate affect, cooperative  Neurologic: Awake, alert, oriented x3, speech clear  Skin: No rashes    Results Reviewed:  LAB RESULTS:      Lab 23  0508 23  1600 23  1551 23  1549   WBC 17.90*  --   --  20.19*   HEMOGLOBIN 12.3  --   --  13.2   HEMOGLOBIN, POC  --  13.6 13.6  --    HEMATOCRIT 36.3  --   --  37.4   HEMATOCRIT POC  --  40 40  --    PLATELETS 330  --   --  380   NEUTROS ABS 15.30*  --   --  18.04*   IMMATURE GRANS (ABS) 0.13*  --   --  0.10*   LYMPHS ABS 1.03  --   --  1.05   MONOS ABS 1.37*  --   --  0.97*   EOS ABS 0.03  --   --  0.01   MCV 89.9  --   --  86.4   PROCALCITONIN  --   --   --  0.15   LACTATE  --   --   --  1.3         Lab 23  1408 23  2213 23  1216 23  0508 23  2231 23  1600 23  1551 23  1549   SODIUM  --   --   --  136 136  --   --  133*   POTASSIUM 3.5 3.6 3.5 3.0* 3.3*  --   --  2.6*   CHLORIDE  --   --   --   98 95*  --   --  90*   CO2  --   --   --  27.0 30.0*  --   --  30.0*   ANION GAP  --   --   --  11.0 11.0  --   --  13.0   BUN  --   --   --  15 17  --   --  19   CREATININE  --   --   --  0.72 0.86 0.90 0.90 0.93   EGFR  --   --   --  75.7 61.1  --  57.9* 55.7*   GLUCOSE  --   --   --  108* 113*  --   --  149*   CALCIUM  --   --   --  8.4 8.8  --   --  8.9   MAGNESIUM  --   --   --  2.1  --   --   --  2.2         Lab 04/29/23  0508 04/28/23  1549   TOTAL PROTEIN 6.4 7.1   ALBUMIN 3.2* 3.6   GLOBULIN 3.2 3.5   ALT (SGPT) 11 18   AST (SGOT) 12 21   BILIRUBIN 0.8 0.7   ALK PHOS 83 81   LIPASE  --  16                     Brief Urine Lab Results  (Last result in the past 365 days)      Color   Clarity   Blood   Leuk Est   Nitrite   Protein   CREAT   Urine HCG        04/28/23 1938 Yellow   Clear   Negative   Negative   Negative   100 mg/dL (2+)                 Microbiology Results Abnormal     None          CT Abdomen Pelvis Without Contrast    Result Date: 4/28/2023  CT ABDOMEN PELVIS WO CONTRAST Date of Exam: 4/28/2023 5:52 PM EDT Indication: right side abd pain, vomiting, may have a mass. Comparison: None available. Technique: Axial CT images were obtained of the abdomen and pelvis without the administration of contrast. Reconstructed coronal and sagittal images were also obtained. Automated exposure control and iterative construction methods were used. Findings: The lung bases are grossly clear. Unremarkable appearance of the liver, gallbladder, bile ducts, spleen. There is hazy peripancreatic fat stranding with otherwise unremarkable appearance of the pancreas; there is thin nonorganized peripancreatic fluid along the inferior margin of the tail (series 901 image 88). Normal appearance of the adrenal glands, kidneys, ureters, and bladder. Fibroid uterus. There are small bilateral ovarian cysts measuring up to 2.8 cm on the right. There is severe sigmoid colonic diverticulosis without evidence of diverticulitis.  Normal appendix and terminal ileum. No evidence of bowel obstruction. There is mild fluid distention of the stomach with fluid likely in the distal esophagus. No abdominopelvic free fluid. No pneumoperitoneum. There is atherosclerosis of the abdominal aorta without evidence of aneurysm. No lymphadenopathy. The body wall soft tissues are unremarkable. The bones are demineralized without acute or suspicious bony findings.     Impression: Impression: Hazy peripancreatic fat stranding and thin nonorganized peripancreatic fluid, findings compatible with acute interstitial pancreatitis and correlate with lipase. Mild fluid distention of the stomach with some fluid in the distal esophagus which predisposes the patient to aspiration. Fibroid uterus. Sigmoid colonic diverticulosis without diverticulitis. Electronically Signed: Joaquin Rubalcava  4/28/2023 6:14 PM EDT  Workstation ID: [id number]    XR Chest 1 View    Result Date: 4/29/2023  EXAM:  XR CHEST 1 VW   DATE: 4/29/2023 3:45 AM HISTORY:   Shortness of air COMPARISON:  None. FINDINGS and     Impression: 1.  Low lung volumes. 2.  Linear opacity right midlung (scarring, atelectasis) 3.  Question right lung base opacity versus elevated diaphragm. 4.  Heart size is normal. 5.  No acute bony findings. Electronically signed by:  Kathrine Calderon M.D.  4/29/2023 3:03 AM Mountain Time          Current medications:  Scheduled Meds:amLODIPine, 5 mg, Oral, Daily  cloNIDine, 1 patch, Transdermal, Weekly  latanoprost, 1 drop, Both Eyes, Nightly  levothyroxine, 75 mcg, Oral, Daily  losartan, 100 mg, Oral, Q24H  PARoxetine, 10 mg, Oral, Daily  sodium chloride, 10 mL, Intravenous, Q12H      Continuous Infusions:   PRN Meds:.•  acetaminophen  •  Magnesium Standard Dose Replacement - Follow Nurse / BPA Driven Protocol  •  ondansetron  •  Potassium Replacement - Follow Nurse / BPA Driven Protocol  •  sodium chloride  •  sodium chloride    Assessment & Plan   Assessment & Plan     Active  Hospital Problems    Diagnosis  POA   • **Hypokalemia [E87.6]  Yes      Resolved Hospital Problems   No resolved problems to display.        Brief Hospital Course to date:  Shayna Paula is a 89 y.o. female with past medical history significant for hypertension, glucoma, history of breast cancer, hypothyroidism, GERD.  Patient was admitted for progressive generalized weakness and hypokalemia.    *Hypokalemia  - Replace per protocol and monitor.     Hypertension  - home losartan and Norvasc was continued.  However, patient still has elevated blood pressure.  We started patient on clonidine patch.  We will continue monitoring.  - Blood pressure is much better controlled today     Hypothyroidism  - Continue Synthroid from home regimen.     History of breast cancer  - No acute issues; treatment for this is complete.     Glaucoma  - Continue home Xalatan drops.     GERD  - We will add daily oral Pepcid.      Expected Discharge Location and Transportation: Home  Expected Discharge 2 to 3 days  No data recorded     DVT prophylaxis:  Mechanical DVT prophylaxis orders are present.          CODE STATUS:   Code Status and Medical Interventions:   Ordered at: 04/28/23 6860     Level Of Support Discussed With:    Patient     Code Status (Patient has no pulse and is not breathing):    No CPR (Do Not Attempt to Resuscitate)     Medical Interventions (Patient has pulse or is breathing):    Full Support     Comments:    DNR/DNI in the event of respiratory and/or cardiac arrest.       Codey Arguelles MD  04/30/23

## 2023-05-01 LAB
HCT VFR BLD AUTO: 30.8 % (ref 34–46.6)
HGB BLD-MCNC: 10.4 G/DL (ref 12–15.9)
POTASSIUM SERPL-SCNC: 4.1 MMOL/L (ref 3.5–5.2)

## 2023-05-01 PROCEDURE — 85014 HEMATOCRIT: CPT | Performed by: INTERNAL MEDICINE

## 2023-05-01 PROCEDURE — 84132 ASSAY OF SERUM POTASSIUM: CPT | Performed by: INTERNAL MEDICINE

## 2023-05-01 PROCEDURE — 85018 HEMOGLOBIN: CPT | Performed by: INTERNAL MEDICINE

## 2023-05-01 PROCEDURE — 99233 SBSQ HOSP IP/OBS HIGH 50: CPT | Performed by: INTERNAL MEDICINE

## 2023-05-01 RX ADMIN — LEVOTHYROXINE SODIUM 75 MCG: 75 TABLET ORAL at 09:44

## 2023-05-01 RX ADMIN — Medication 10 ML: at 09:45

## 2023-05-01 RX ADMIN — LATANOPROST 1 DROP: 50 SOLUTION OPHTHALMIC at 19:40

## 2023-05-01 RX ADMIN — PAROXETINE HYDROCHLORIDE 10 MG: 20 TABLET, FILM COATED ORAL at 09:44

## 2023-05-01 NOTE — PLAN OF CARE
Goal Outcome Evaluation:           Progress: improving  Outcome Evaluation: Pt has had low SBP today. Norvask and losartan held and clonidine patch removed. SR/SB on monitor. 2 liter NC. No complaints.

## 2023-05-01 NOTE — PROGRESS NOTES
[facility] Medicine Services  PROGRESS NOTE    Patient Name: Shayna Paula  : 1924  MRN: [id number]    Date of Admission: 2023  Primary Care Physician: Riky Sue,     Subjective   Subjective     CC:  Weakness    HPI:  Resting in bed in no acute distress but is very lethargic.  She specifically tells me that she feels very weak.  No fever or chills.  No chest pain or palpitation.  No nausea vomiting or diarrhea.  No headache.    ROS:       Objective   Objective     Vital Signs:   Temp:  [97.6 °F (36.4 °C)-98.9 °F (37.2 °C)] 97.6 °F (36.4 °C)  Heart Rate:  [57-83] 68  Resp:  [16-18] 16  BP: (124-167)/(52-86) 131/54  Flow (L/min):  [2] 2     Physical Exam:  Constitutional: No acute distress, looks very listless  HENT: NCAT, mucous membranes moist  Respiratory: Clear to auscultation bilaterally, respiratory effort normal   Cardiovascular: RRR, no murmurs, rubs, or gallops  Gastrointestinal: Positive bowel sounds, soft, nontender, nondistended  Musculoskeletal: No bilateral ankle edema  Psychiatric: Appropriate affect, cooperative  Neurologic: Awake, alert, oriented x3, speech clear  Skin: No rashes    Results Reviewed:  LAB RESULTS:      Lab 23  1418 23  0508 23  1600 23  1551 23  1549   WBC  --  17.90*  --   --  20.19*   HEMOGLOBIN 10.4* 12.3  --   --  13.2   HEMOGLOBIN, POC  --   --  13.6 13.6  --    HEMATOCRIT 30.8* 36.3  --   --  37.4   HEMATOCRIT POC  --   --  40 40  --    PLATELETS  --  330  --   --  380   NEUTROS ABS  --  15.30*  --   --  18.04*   IMMATURE GRANS (ABS)  --  0.13*  --   --  0.10*   LYMPHS ABS  --  1.03  --   --  1.05   MONOS ABS  --  1.37*  --   --  0.97*   EOS ABS  --  0.03  --   --  0.01   MCV  --  89.9  --   --  86.4   PROCALCITONIN  --   --   --   --  0.15   LACTATE  --   --   --   --  1.3         Lab 23  0407 23  1408 23  2213 23  1216 23  0508 23  2231 23  1600  04/28/23  1551 04/28/23  1549   SODIUM  --   --   --   --  136 136  --   --  133*   POTASSIUM 4.1 3.5 3.6 3.5 3.0* 3.3*  --   --  2.6*   CHLORIDE  --   --   --   --  98 95*  --   --  90*   CO2  --   --   --   --  27.0 30.0*  --   --  30.0*   ANION GAP  --   --   --   --  11.0 11.0  --   --  13.0   BUN  --   --   --   --  15 17  --   --  19   CREATININE  --   --   --   --  0.72 0.86 0.90 0.90 0.93   EGFR  --   --   --   --  75.7 61.1  --  57.9* 55.7*   GLUCOSE  --   --   --   --  108* 113*  --   --  149*   CALCIUM  --   --   --   --  8.4 8.8  --   --  8.9   MAGNESIUM  --   --   --   --  2.1  --   --   --  2.2         Lab 04/29/23  0508 04/28/23  1549   TOTAL PROTEIN 6.4 7.1   ALBUMIN 3.2* 3.6   GLOBULIN 3.2 3.5   ALT (SGPT) 11 18   AST (SGOT) 12 21   BILIRUBIN 0.8 0.7   ALK PHOS 83 81   LIPASE  --  16                     Brief Urine Lab Results  (Last result in the past 365 days)      Color   Clarity   Blood   Leuk Est   Nitrite   Protein   CREAT   Urine HCG        04/28/23 1938 Yellow   Clear   Negative   Negative   Negative   100 mg/dL (2+)                 Microbiology Results Abnormal     None          No radiology results from the last 24 hrs        Current medications:  Scheduled Meds:amLODIPine, 5 mg, Oral, Daily  latanoprost, 1 drop, Both Eyes, Nightly  levothyroxine, 75 mcg, Oral, Daily  losartan, 100 mg, Oral, Q24H  PARoxetine, 10 mg, Oral, Daily  sodium chloride, 10 mL, Intravenous, Q12H      Continuous Infusions:   PRN Meds:.•  acetaminophen  •  Magnesium Standard Dose Replacement - Follow Nurse / BPA Driven Protocol  •  ondansetron  •  Potassium Replacement - Follow Nurse / BPA Driven Protocol  •  sodium chloride  •  sodium chloride    Assessment & Plan   Assessment & Plan     Active Hospital Problems    Diagnosis  POA   • **Hypokalemia [E87.6]  Yes      Resolved Hospital Problems   No resolved problems to display.        Brief Hospital Course to date:  Shayna Dominiquebrown is a 89 y.o. female with past medical  history significant for hypertension, glucoma, history of breast cancer, hypothyroidism, GERD.  Patient was admitted for progressive generalized weakness and hypokalemia.    *Hypokalemia  - Replace per protocol and monitor.     Hypertension  - home losartan and Norvasc was continued.  However, patient still had elevated blood pressure.  We started patient on clonidine patch.  Today blood pressure is on the low side and we held Norvasc and losartan.  In the afternoon still blood pressure was systolic around 125 or so.  Quite possibly the patient's lethargy, at least partially, has to do with the fact that her blood pressure has been high and now is low particularly the diastolic is low.  We will remove the clonidine patch and monitor.    Hypothyroidism  - Continue Synthroid from home regimen.     History of breast cancer  - No acute issues; treatment for this is complete.     Glaucoma  - Continue home Xalatan drops.     GERD  - We will add daily oral Pepcid.      Expected Discharge Location and Transportation: Home  Expected Discharge 2 to 3 days  No data recorded     DVT prophylaxis:  Mechanical DVT prophylaxis orders are present.          CODE STATUS:   Code Status and Medical Interventions:   Ordered at: 04/28/23 0241     Level Of Support Discussed With:    Patient     Code Status (Patient has no pulse and is not breathing):    No CPR (Do Not Attempt to Resuscitate)     Medical Interventions (Patient has pulse or is breathing):    Full Support     Comments:    DNR/DNI in the event of respiratory and/or cardiac arrest.       Codey Arguelles MD  05/01/23

## 2023-05-01 NOTE — CASE MANAGEMENT/SOCIAL WORK
Discharge Planning Assessment  [facility]     Patient Name: Shayna Paula  MRN: [id number]  Today's Date: 5/1/2023    Admit Date: 4/28/2023    Plan: ONGOING   Discharge Needs Assessment     Row Name 05/01/23 1609       Living Environment    People in Home alone    Current Living Arrangements apartment    Primary Care Provided by self    Provides Primary Care For no one    Family Caregiver if Needed child(yesenia), adult    Quality of Family Relationships helpful;involved;supportive    Able to Return to Prior Arrangements yes    Living Arrangement Comments [facility]       Transition Planning    Patient/Family Anticipated Services at Transition     Transportation Anticipated family or friend will provide       Discharge Needs Assessment    Readmission Within the Last 30 Days no previous admission in last 30 days    Equipment Currently Used at Home walker, rolling;wheelchair    Concerns to be Addressed discharge planning    Anticipated Changes Related to Illness none    Equipment Needed After Discharge none    Outpatient/Agency/Support Group Needs outpatient therapy    Discharge Facility/Level of Care Needs --  TBD    Current Discharge Risk chronically ill;lives alone               Discharge Plan     Row Name 05/01/23 1618       Plan    Plan ONGOING    Patient/Family in Agreement with Plan yes    Plan Comments Met with pt and DIL at bedside for DCP.  Pt resides alone in [facility] at [facility].  She is independent with most ADLs.  Has assistance with bathing.  She is enrolled in OP PT at [facility] at [facility].  She uses a RW with ambulation and has a WC for longer distances.  She has home O2 per [employer] and has purchased her own [employer] POC.  Confirmed she has Medicare and [employer] insurance with Rx coverage.  Goal is to return home upon DC.  DIL advised that she can arrange for extra in-home assistance 24/7 for a short time upon DC if needed.  CM will cont to  follow and assist with any needs.    Final Discharge Disposition Code 30 - still a patient              Continued Care and Services - Admitted Since 4/28/2023    Coordination has not been started for this encounter.       Expected Discharge Date and Time     Expected Discharge Date Expected Discharge Time    May 3, 2023          Demographic Summary     Row Name 05/01/23 1609       General Information    Admission Type inpatient    Referral Source admission list    Reason for Consult discharge planning    Preferred Language English       Contact Information    Permission Granted to Share Info With     Contact Information Obtained for                Functional Status     Row Name 05/01/23 1609       Functional Status    Usual Activity Tolerance moderate    Current Activity Tolerance moderate       Functional Status, IADL    Medications independent    Meal Preparation assistive person    Housekeeping completely dependent    Laundry completely dependent    Shopping completely dependent               Psychosocial    No documentation.                Abuse/Neglect    No documentation.                Legal    No documentation.                Substance Abuse    No documentation.                Patient Forms    No documentation.                   Ashley Covarrubias RN

## 2023-05-01 NOTE — PLAN OF CARE
Goal Outcome Evaluation:  Plan of Care Reviewed With: patient           Outcome Evaluation: VSS Pt resting in bed without complaints

## 2023-05-02 LAB
ANION GAP SERPL CALCULATED.3IONS-SCNC: 8 MMOL/L (ref 5–15)
BASOPHILS # BLD AUTO: 0.05 10*3/MM3 (ref 0–0.2)
BASOPHILS NFR BLD AUTO: 0.4 % (ref 0–1.5)
BUN SERPL-MCNC: 24 MG/DL (ref 8–23)
BUN/CREAT SERPL: 25 (ref 7–25)
CALCIUM SPEC-SCNC: 8.4 MG/DL (ref 8.2–9.6)
CHLORIDE SERPL-SCNC: 95 MMOL/L (ref 98–107)
CO2 SERPL-SCNC: 28 MMOL/L (ref 22–29)
CREAT SERPL-MCNC: 0.96 MG/DL (ref 0.57–1)
DEPRECATED RDW RBC AUTO: 47.2 FL (ref 37–54)
EGFRCR SERPLBLD CKD-EPI 2021: 53.6 ML/MIN/1.73
EOSINOPHIL # BLD AUTO: 0.13 10*3/MM3 (ref 0–0.4)
EOSINOPHIL NFR BLD AUTO: 0.9 % (ref 0.3–6.2)
ERYTHROCYTE [DISTWIDTH] IN BLOOD BY AUTOMATED COUNT: 14.1 % (ref 12.3–15.4)
GLUCOSE SERPL-MCNC: 103 MG/DL (ref 65–99)
HCT VFR BLD AUTO: 31.7 % (ref 34–46.6)
HGB BLD-MCNC: 10.7 G/DL (ref 12–15.9)
IMM GRANULOCYTES # BLD AUTO: 0.09 10*3/MM3 (ref 0–0.05)
IMM GRANULOCYTES NFR BLD AUTO: 0.6 % (ref 0–0.5)
LYMPHOCYTES # BLD AUTO: 1.16 10*3/MM3 (ref 0.7–3.1)
LYMPHOCYTES NFR BLD AUTO: 8.2 % (ref 19.6–45.3)
MAGNESIUM SERPL-MCNC: 2 MG/DL (ref 1.7–2.3)
MCH RBC QN AUTO: 30.5 PG (ref 26.6–33)
MCHC RBC AUTO-ENTMCNC: 33.8 G/DL (ref 31.5–35.7)
MCV RBC AUTO: 90.3 FL (ref 79–97)
MONOCYTES # BLD AUTO: 1.6 10*3/MM3 (ref 0.1–0.9)
MONOCYTES NFR BLD AUTO: 11.4 % (ref 5–12)
NEUTROPHILS NFR BLD AUTO: 11.06 10*3/MM3 (ref 1.7–7)
NEUTROPHILS NFR BLD AUTO: 78.5 % (ref 42.7–76)
NRBC BLD AUTO-RTO: 0 /100 WBC (ref 0–0.2)
PHOSPHATE SERPL-MCNC: 3.5 MG/DL (ref 2.5–4.5)
PLATELET # BLD AUTO: 358 10*3/MM3 (ref 140–450)
PMV BLD AUTO: 9.8 FL (ref 6–12)
POTASSIUM SERPL-SCNC: 4.8 MMOL/L (ref 3.5–5.2)
QT INTERVAL: 414 MS
QT INTERVAL: 444 MS
QTC INTERVAL: 423 MS
QTC INTERVAL: 482 MS
RBC # BLD AUTO: 3.51 10*6/MM3 (ref 3.77–5.28)
SODIUM SERPL-SCNC: 131 MMOL/L (ref 136–145)
WBC NRBC COR # BLD: 14.09 10*3/MM3 (ref 3.4–10.8)

## 2023-05-02 PROCEDURE — 83735 ASSAY OF MAGNESIUM: CPT | Performed by: INTERNAL MEDICINE

## 2023-05-02 PROCEDURE — 80048 BASIC METABOLIC PNL TOTAL CA: CPT | Performed by: INTERNAL MEDICINE

## 2023-05-02 PROCEDURE — 84100 ASSAY OF PHOSPHORUS: CPT | Performed by: INTERNAL MEDICINE

## 2023-05-02 PROCEDURE — 99233 SBSQ HOSP IP/OBS HIGH 50: CPT | Performed by: INTERNAL MEDICINE

## 2023-05-02 PROCEDURE — 97161 PT EVAL LOW COMPLEX 20 MIN: CPT

## 2023-05-02 PROCEDURE — 93010 ELECTROCARDIOGRAM REPORT: CPT | Performed by: INTERNAL MEDICINE

## 2023-05-02 PROCEDURE — 97535 SELF CARE MNGMENT TRAINING: CPT

## 2023-05-02 PROCEDURE — 93005 ELECTROCARDIOGRAM TRACING: CPT | Performed by: INTERNAL MEDICINE

## 2023-05-02 PROCEDURE — 97165 OT EVAL LOW COMPLEX 30 MIN: CPT

## 2023-05-02 PROCEDURE — 85025 COMPLETE CBC W/AUTO DIFF WBC: CPT | Performed by: INTERNAL MEDICINE

## 2023-05-02 PROCEDURE — 97530 THERAPEUTIC ACTIVITIES: CPT

## 2023-05-02 RX ORDER — LOSARTAN POTASSIUM 50 MG/1
100 TABLET ORAL
Status: DISCONTINUED | OUTPATIENT
Start: 2023-05-03 | End: 2023-05-03 | Stop reason: HOSPADM

## 2023-05-02 RX ORDER — AMLODIPINE BESYLATE 2.5 MG/1
2.5 TABLET ORAL EVERY 12 HOURS
Status: DISCONTINUED | OUTPATIENT
Start: 2023-05-02 | End: 2023-05-03

## 2023-05-02 RX ADMIN — LATANOPROST 1 DROP: 50 SOLUTION OPHTHALMIC at 20:51

## 2023-05-02 RX ADMIN — LOSARTAN POTASSIUM 100 MG: 50 TABLET, FILM COATED ORAL at 08:58

## 2023-05-02 RX ADMIN — LEVOTHYROXINE SODIUM 75 MCG: 75 TABLET ORAL at 08:58

## 2023-05-02 RX ADMIN — PAROXETINE HYDROCHLORIDE 10 MG: 20 TABLET, FILM COATED ORAL at 08:58

## 2023-05-02 RX ADMIN — AMLODIPINE BESYLATE 2.5 MG: 2.5 TABLET ORAL at 17:59

## 2023-05-02 NOTE — PLAN OF CARE
Goal Outcome Evaluation:  Plan of Care Reviewed With: patient           Outcome Evaluation: PT eval completed. Patient demonstrating deficits in general BLE strength, standing static/dynamic balance, gait quality, and functional endurance effecting functional mobility below baseline. Patient will benefit from skilled IP PT services to address impairments for return to PLOF. Recommend SNF at [place].

## 2023-05-02 NOTE — PLAN OF CARE
Goal Outcome Evaluation:  Plan of Care Reviewed With: patient        Progress: no change  Outcome Evaluation: Pt. presents below baseline with ADLs and functional mobility. Limited by decreased activity tolerance and balance. Skilled OT services warranted to promote return to PLOF. Recommend SNF at discharge.

## 2023-05-02 NOTE — PLAN OF CARE
Goal Outcome Evaluation:  Plan of Care Reviewed With: patient        Progress: improving  Outcome Evaluation: Patient up to the chair part of the day. Assist of 1. VSS. Remains on 2 L NC. SR. Appetite good. NAD noted.

## 2023-05-02 NOTE — THERAPY EVALUATION
Patient Name: Shayna Paula  : 1924    MRN: [id number]                              Today's Date: 2023       Admit Date: 2023    Visit Dx:     ICD-10-CM ICD-9-CM   1. Hypokalemia  E87.6 276.8   2. Leukocytosis, unspecified type  D72.829 288.60   3. Right sided abdominal pain  R10.9 789.09   4. Nausea and vomiting, unspecified vomiting type  R11.2 787.01     Patient Active Problem List   Diagnosis   • Hypokalemia     Past Medical History:   Diagnosis Date   • Breast cancer    • Fibromyalgia    • Hypertension      History reviewed. No pertinent surgical history.   General Information     Row Name 23 1142          OT Time and Intention    Document Type evaluation  -     Mode of Treatment occupational therapy  -     Row Name 23 1142          General Information    Patient Profile Reviewed yes  -LC     Prior Level of Function independent:;all household mobility;transfer;grooming;dressing;feeding;min assist:;bathing  -     Existing Precautions/Restrictions fall;oxygen therapy device and L/min  -     Barriers to Rehab medically complex  -     Row Name 23 1142          Living Environment    People in Home alone;facility resident  IND living  -     Row Name 23 1142          Home Main Entrance    Number of Stairs, Main Entrance none  -LC     Row Name 23 1142          Stairs Within Home, Primary    Number of Stairs, Within Home, Primary none  -     Row Name 23 1142          Cognition    Orientation Status (Cognition) oriented x 3;verbal cues/prompts needed for orientation  -     Row Name 23 1142          Safety Issues, Functional Mobility    Safety Issues Affecting Function (Mobility) awareness of need for assistance;insight into deficits/self-awareness;safety precaution awareness;safety precautions follow-through/compliance;sequencing abilities  -     Impairments Affecting Function (Mobility) balance;endurance/activity  tolerance;pain;strength;shortness of breath  -           User Key  (r) = Recorded By, (t) = Taken By, (c) = Cosigned By    Initials Name Provider Type     Yvonne Rick OT Occupational Therapist                 Mobility/ADL's     Row Name 05/02/23 1144          Bed Mobility    Bed Mobility scooting/bridging;supine-sit  -LC     Scooting/Bridging [place] (Bed Mobility) contact guard  -     Supine-Sit [place] (Bed Mobility) contact guard  -     Assistive Device (Bed Mobility) bed rails;head of bed elevated  -     Comment, (Bed Mobility) VC's to sequence transitioning from supine to sit EOB. Increased time needed.  -     Row Name 05/02/23 1144          Transfers    Transfers sit-stand transfer;toilet transfer  -     Comment, (Transfers) VC's for hand placement and sequencing.  -     Row Name 05/02/23 1144          Sit-Stand Transfer    Sit-Stand [place] (Transfers) minimum assist (75% patient effort);verbal cues  -     Assistive Device (Sit-Stand Transfers) other (see comments)  BUE support  -     Row Name 05/02/23 1144          Toilet Transfer    Type (Toilet Transfer) sit-stand;stand-sit  -     [place] Level (Toilet Transfer) minimum assist (75% patient effort);verbal cues  -     Assistive Device (Toilet Transfer) commode, bedside without drop arms;other (see comments)  BUE support  -     Row Name 05/02/23 1144          Activities of Daily Living    BADL Assessment/Intervention toileting;grooming;lower body dressing  -     Row Name 05/02/23 1144          Toileting Assessment/Training    [place] Level (Toileting) adjust/manage clothing;perform perineal hygiene;maximum assist (25% patient effort)  -     Assistive Devices (Toileting) commode, bedside without drop arms  -     Position (Toileting) supported sitting;supported standing  -     Comment, (Toileting) Increased time needed.  -     Row Name 05/02/23 1144          Grooming Assessment/Training     [place] Level (Grooming) wash face, hands;hair care, combing/brushing;set up  -     Position (Grooming) supported sitting  -     Row Name 05/02/23 1144          Lower Body Dressing Assessment/Training    [place] Level (Lower Body Dressing) don;pants/bottoms;maximum assist (25% patient effort);socks  -     Position (Lower Body Dressing) supported sitting;supported standing  -           User Key  (r) = Recorded By, (t) = Taken By, (c) = Cosigned By    Initials Name Provider Type     Yvonne Rick OT Occupational Therapist               Obj/Interventions     Row Name 05/02/23 1150          Sensory Assessment (Somatosensory)    Sensory Assessment (Somatosensory) UE sensation intact  -     Row Name 05/02/23 1150          Vision Assessment/Intervention    Visual Impairment/Limitations corrective lenses full-time  -     Row Name 05/02/23 1150          Range of Motion Comprehensive    General Range of Motion bilateral upper extremity ROM WFL  -[facility] Name 05/02/23 1150          Strength Comprehensive (MMT)    General Manual Muscle Testing (MMT) Assessment upper extremity strength deficits identified  -     Row Name 05/02/23 1150          Upper Extremity (Manual Muscle Testing)    Upper Extremity: Manual Muscle Testing (MMT) left UE strength is WFL;right UE strength is WFL  -     Row Name 05/02/23 1150          Motor Skills    Motor Skills functional endurance  -     Functional Endurance impaired activity tolerance  -     Row Name 05/02/23 1150          Balance    Balance Assessment sitting static balance;sitting dynamic balance;standing dynamic balance;standing static balance  -     Static Sitting Balance standby assist  -     Dynamic Sitting Balance contact guard  -     Position, Sitting Balance unsupported;sitting edge of bed;supported;sitting in chair  -     Static Standing Balance minimal assist;verbal cues  -     Dynamic Standing Balance minimal assist;verbal cues  -      Position/Device Used, Standing Balance supported;other (see comments)  BUE support  -     Balance Interventions sitting;standing;sit to stand;supported;occupation based/functional task;weight shifting activity  -     Comment, Balance Min A to steady  -           User Key  (r) = Recorded By, (t) = Taken By, (c) = Cosigned By    Initials Name Provider Type    Yvonne Talbot OT Occupational Therapist               Goals/Plan     Row Name 05/02/23 1152          Transfer Goal 1 (OT)    Activity/Assistive Device (Transfer Goal 1, OT) sit-to-stand/stand-to-sit;bed-to-chair/chair-to-bed;walker, rolling  -     [place] Level/Cues Needed (Transfer Goal 1, OT) contact guard required;verbal cues required  -     Time Frame (Transfer Goal 1, OT) long term goal (LTG);by discharge  -     Progress/Outcome (Transfer Goal 1, OT) goal ongoing  -     Row Name 05/02/23 1156          Dressing Goal 1 (OT)    Activity/Device (Dressing Goal 1, OT) lower body dressing  -     [place]/Cues Needed (Dressing Goal 1, OT) verbal cues required;moderate assist (50-74% patient effort)  -     Time Frame (Dressing Goal 1, OT) long term goal (LTG);by discharge  -     Progress/Outcome (Dressing Goal 1, OT) goal ongoing  -     Row Name 05/02/23 8684          Toileting Goal 1 (OT)    Activity/Device (Toileting Goal 1, OT) adjust/manage clothing;perform perineal hygiene;commode, bedside without drop arms  -     [place] Level/Cues Needed (Toileting Goal 1, OT) moderate assist (50-74% patient effort);verbal cues required  -     Time Frame (Toileting Goal 1, OT) long term goal (LTG);by discharge  -     Progress/Outcome (Toileting Goal 1, OT) goal ongoing  -           User Key  (r) = Recorded By, (t) = Taken By, (c) = Cosigned By    Initials Name Provider Type    Yvonne Talbot OT Occupational Therapist               Clinical Impression     Row Name 05/02/23 1151          Pain Assessment    Pretreatment Pain  Rating 0/10 - no pain  -     Posttreatment Pain Rating 0/10 - no pain  -     Additional Documentation Pain Scale: Word Pre/Post-Treatment (Group)  -     Row Name 05/02/23 1152          Plan of Care Review    Plan of Care Reviewed With patient  -     Progress no change  -     Outcome Evaluation Pt. presents below baseline with ADLs and functional mobility. Limited by decreased activity tolerance and balance. Skilled OT services warranted to promote return to PLOF. Recommend SNF at discharge.  -     Row Name 05/02/23 1152          Therapy Assessment/Plan (OT)    Patient/Family Therapy Goal Statement (OT) To return to PLOF.  -     Therapy Frequency (OT) daily  -     Row Name 05/02/23 1152          Therapy Plan Review/Discharge Plan (OT)    Anticipated Discharge Disposition (OT) skilled nursing facility  -     Row Name 05/02/23 1152          Vital Signs    Pre Systolic BP Rehab --  VSS  -     Pre Patient Position Supine  -     Intra Patient Position Standing  -     Post Patient Position Sitting  -     Row Name 05/02/23 1152          Positioning and Restraints    Pre-Treatment Position in bed  -     Post Treatment Position chair  -           User Key  (r) = Recorded By, (t) = Taken By, (c) = Cosigned By    Initials Name Provider Type     Yvonne Rick, OT Occupational Therapist               Outcome Measures     Row Name 05/02/23 1156          How much help from another is currently needed...    Putting on and taking off regular lower body clothing? 2  -LC     Bathing (including washing, rinsing, and drying) 2  -LC     Toileting (which includes using toilet bed pan or urinal) 2  -LC     Putting on and taking off regular upper body clothing 3  -LC     Taking care of personal grooming (such as brushing teeth) 3  -LC     Eating meals 4  -LC     AM-PAC 6 Clicks Score (OT) 16  -     Row Name 05/02/23 1130          How much help from another person do you currently need...    Turning from  your back to your side while in flat bed without using bedrails? 3  -LO     Moving from lying on back to sitting on the side of a flat bed without bedrails? 3  -LO     Moving to and from a bed to a chair (including a wheelchair)? 3  -LO     Standing up from a chair using your arms (e.g., wheelchair, bedside chair)? 3  -LO     Climbing 3-5 steps with a railing? 1  -LO     To walk in hospital room? 2  -LO     AM-PAC 6 Clicks Score (PT) 15  -LO     Highest level of mobility 4 --> Transferred to chair/commode  -LO     Row Name 05/02/23 1156 05/02/23 1130       Functional Assessment    Outcome Measure Options AM-PAC 6 Clicks Daily Activity (OT)  - AM-PAC 6 Clicks Basic Mobility (PT)  -          User Key  (r) = Recorded By, (t) = Taken By, (c) = Cosigned By    Initials Name Provider Type    Yvonne Talbot, OT Occupational Therapist    Priscila Joe, PT Physical Therapist                Occupational Therapy Education     Title: PT OT SLP Therapies (In Progress)     Topic: Occupational Therapy (In Progress)     Point: ADL training (In Progress)     Description:   Instruct learner(s) on proper safety adaptation and remediation techniques during self care or transfers.   Instruct in proper use of assistive devices.              Learning Progress Summary           Patient Acceptance, E, NR by  at 5/2/2023 1003                   Point: Home exercise program (Not Started)     Description:   Instruct learner(s) on appropriate technique for monitoring, assisting and/or progressing therapeutic exercises/activities.              Learner Progress:  Not documented in this visit.          Point: Precautions (In Progress)     Description:   Instruct learner(s) on prescribed precautions during self-care and functional transfers.              Learning Progress Summary           Patient Acceptance, E, NR by  at 5/2/2023 1003                   Point: Body mechanics (In Progress)     Description:   Instruct learner(s) on proper  positioning and spine alignment during self-care, functional mobility activities and/or exercises.              Learning Progress Summary           Patient Acceptance, E, NR by  at 5/2/2023 1003                               User Key     Initials Effective Dates Name Provider Type [facility] 06/16/21 -  Yvonne Rick OT Occupational Therapist OT              OT Recommendation and Plan  Therapy Frequency (OT): daily  Plan of Care Review  Plan of Care Reviewed With: patient  Progress: no change  Outcome Evaluation: Pt. presents below baseline with ADLs and functional mobility. Limited by decreased activity tolerance and balance. Skilled OT services warranted to promote return to PLOF. Recommend SNF at discharge.     Time Calculation:    Time Calculation- OT     Row Name 05/02/23 1157 05/02/23 1043          Time Calculation- OT    OT Start Time 1003  - --     OT Received On 05/02/23  - --     OT Goal Re-Cert Due Date 05/12/23  - --        Timed Charges    36150 - Gait Training Minutes  -- 8  -LO     29778 - OT Self Care/Mgmt Minutes 13  -LC --        Untimed Charges    OT Eval/Re-eval Minutes 49  -LC --        Total Minutes    Timed Charges Total Minutes 13  -LC 8  -LO     Untimed Charges Total Minutes 49  -LC --      Total Minutes 62  -LC 8  -LO           User Key  (r) = Recorded By, (t) = Taken By, (c) = Cosigned By    Initials Name Provider Type     Yvonne Rick OT Occupational Therapist    Priscila Joe, PT Physical Therapist              Therapy Charges for Today     Code Description Service Date Service Provider Modifiers Qty    41431757025 HC OT SELF CARE/MGMT/TRAIN EA 15 MIN 5/2/2023 Yvonne Rick OT GO 1    66664322856 HC OT EVAL LOW COMPLEXITY 4 5/2/2023 Yvonne Rick, OT GO 1    39351423588 HC OT THER SUPP EA 15 MIN 5/2/2023 Yvonne Rick OT GO 3               Yvonne Rick OT  5/2/2023

## 2023-05-02 NOTE — PROGRESS NOTES
[facility] Medicine Services  PROGRESS NOTE    Patient Name: Shayna Paula  : 1924  MRN: [id number]    Date of Admission: 2023  Primary Care Physician: Riky Sue,     Subjective   Subjective     CC:  Weakness    HPI:  Resting in bed in no acute distress and feels much better today.  Tells me that she feels stronger and is almost back to her baseline.  No fever or chills.  No chest pain or palpitation.  No nausea vomiting or diarrhea.  No headache.    ROS:       Objective   Objective     Vital Signs:   Temp:  [97.5 °F (36.4 °C)-98.3 °F (36.8 °C)] 97.5 °F (36.4 °C)  Heart Rate:  [60-73] 64  Resp:  [15-18] 15  BP: (149-155)/(60-64) 152/60  Flow (L/min):  [2] 2     Physical Exam:  Constitutional: No acute distress  HENT: NCAT, mucous membranes moist  Respiratory: Clear to auscultation bilaterally, respiratory effort normal   Cardiovascular: RRR, no murmurs, rubs, or gallops  Gastrointestinal: Positive bowel sounds, soft, nontender, nondistended  Musculoskeletal: No bilateral ankle edema  Psychiatric: Appropriate affect, cooperative  Neurologic: Awake, alert, oriented x3, speech clear  Skin: No rashes    Results Reviewed:  LAB RESULTS:      Lab 23  0612 23  1418 23  0508 23  1600 23  1551 23  1549   WBC 14.09*  --  17.90*  --   --  20.19*   HEMOGLOBIN 10.7* 10.4* 12.3  --   --  13.2   HEMOGLOBIN, POC  --   --   --  13.6 13.6  --    HEMATOCRIT 31.7* 30.8* 36.3  --   --  37.4   HEMATOCRIT POC  --   --   --  40 40  --    PLATELETS 358  --  330  --   --  380   NEUTROS ABS 11.06*  --  15.30*  --   --  18.04*   IMMATURE GRANS (ABS) 0.09*  --  0.13*  --   --  0.10*   LYMPHS ABS 1.16  --  1.03  --   --  1.05   MONOS ABS 1.60*  --  1.37*  --   --  0.97*   EOS ABS 0.13  --  0.03  --   --  0.01   MCV 90.3  --  89.9  --   --  86.4   PROCALCITONIN  --   --   --   --   --  0.15   LACTATE  --   --   --   --   --  1.3         [facility] 23  0612  05/01/23  0407 04/30/23  1408 04/29/23  2213 04/29/23  1216 04/29/23  0508 04/28/23  2231 04/28/23  1600 04/28/23  1551 04/28/23  1549   SODIUM 131*  --   --   --   --  136 136  --   --  133*   POTASSIUM 4.8 4.1 3.5 3.6 3.5 3.0* 3.3*  --   --  2.6*   CHLORIDE 95*  --   --   --   --  98 95*  --   --  90*   CO2 28.0  --   --   --   --  27.0 30.0*  --   --  30.0*   ANION GAP 8.0  --   --   --   --  11.0 11.0  --   --  13.0   BUN 24*  --   --   --   --  15 17  --   --  19   CREATININE 0.96  --   --   --   --  0.72 0.86 0.90 0.90 0.93   EGFR 53.6*  --   --   --   --  75.7 61.1  --  57.9* 55.7*   GLUCOSE 103*  --   --   --   --  108* 113*  --   --  149*   CALCIUM 8.4  --   --   --   --  8.4 8.8  --   --  8.9   MAGNESIUM 2.0  --   --   --   --  2.1  --   --   --  2.2   PHOSPHORUS 3.5  --   --   --   --   --   --   --   --   --          Lab 04/29/23  0508 04/28/23  1549   TOTAL PROTEIN 6.4 7.1   ALBUMIN 3.2* 3.6   GLOBULIN 3.2 3.5   ALT (SGPT) 11 18   AST (SGOT) 12 21   BILIRUBIN 0.8 0.7   ALK PHOS 83 81   LIPASE  --  16                     Brief Urine Lab Results  (Last result in the past 365 days)      Color   Clarity   Blood   Leuk Est   Nitrite   Protein   CREAT   Urine HCG        04/28/23 1938 Yellow   Clear   Negative   Negative   Negative   100 mg/dL (2+)                 Microbiology Results Abnormal     None          No radiology results from the last 24 hrs        Current medications:  Scheduled Meds:amLODIPine, 2.5 mg, Oral, Q12H  latanoprost, 1 drop, Both Eyes, Nightly  levothyroxine, 75 mcg, Oral, Daily  [START ON 5/3/2023] losartan, 100 mg, Oral, Q24H  PARoxetine, 10 mg, Oral, Daily  sodium chloride, 10 mL, Intravenous, Q12H      Continuous Infusions:   PRN Meds:.•  acetaminophen  •  Magnesium Standard Dose Replacement - Follow Nurse / BPA Driven Protocol  •  ondansetron  •  Potassium Replacement - Follow Nurse / BPA Driven Protocol  •  sodium chloride  •  sodium chloride    Assessment & Plan   Assessment &  Plan     Active Hospital Problems    Diagnosis  POA   • **Hypokalemia [E87.6]  Yes      Resolved Hospital Problems   No resolved problems to display.        Brief Hospital Course to date:  Shayna Paula is a 89 y.o. female with past medical history significant for hypertension, glucoma, history of breast cancer, hypothyroidism, GERD.  Patient was admitted for progressive generalized weakness and hypokalemia.    *Hypokalemia  - Replace per protocol and monitor.  --Seems to be stable     Hypertension  - home losartan and Norvasc was continued.  However, patient still had elevated blood pressure.  We started patient on clonidine patch.  Then blood pressure went on the low side and Norvasc and losartan were held.  Then we discontinued clonidine patch and just observe.  The next day gradually blood pressure came up.  Today we restarted the patient on losartan and also amlodipine.    Hypothyroidism  - Continue Synthroid from home regimen.     History of breast cancer  - No acute issues; treatment for this is complete.     Glaucoma  - Continue home Xalatan drops.     GERD  - We will add daily oral Pepcid.    Leukocytosis  -Patient has been afebrile and there is no sign of infection.  -WBC has been coming down.  We will recheck tomorrow.  If it is elevated then repeat chest x-ray and also UA could be considered.      Expected Discharge Location and Transportation: Home  Expected Discharge 1-2 days  Expected Discharge Date: 5/3/2023; Expected Discharge Time:      DVT prophylaxis:  Mechanical DVT prophylaxis orders are present.     AM-PAC 6 Clicks Score (PT): 15 (05/02/23 1590)    CODE STATUS:   Code Status and Medical Interventions:   Ordered at: 04/28/23 8729     Level Of Support Discussed With:    Patient     Code Status (Patient has no pulse and is not breathing):    No CPR (Do Not Attempt to Resuscitate)     Medical Interventions (Patient has pulse or is breathing):    Full Support     Comments:    DNR/DNI in the event of  respiratory and/or cardiac arrest.       Codey Arguelles MD  05/02/23

## 2023-05-02 NOTE — CASE MANAGEMENT/SOCIAL WORK
Discharge Planning Assessment  [facility]     Patient Name: Shayna Paula  MRN: [id number]  Today's Date: 5/2/2023    Admit Date: 4/28/2023    Plan: SNF   Discharge Needs Assessment    No documentation.                Discharge Plan     Row Name 05/02/23 1340       Plan    Plan SNF    Patient/Family in Agreement with Plan yes    Plan Comments Spoke with Sun CABRERA, via phone.  Discussed therapy recs for SNF.  She and pt are agreeable.  Per request, CM called referral to Bernice at [facility].  CM will cont to follow for medical readiness.    Final Discharge Disposition Code 03 - skilled nursing facility (SNF)              Continued Care and Services - Admitted Since 4/28/2023     Destination     Service Provider Request Status Selected Services Address Phone Fax Patient Preferred    [facility] Pending - No Request Sent N/A [address] [facility] [address] [phone] --              Expected Discharge Date and Time     Expected Discharge Date Expected Discharge Time    May 3, 2023          Demographic Summary    No documentation.                Functional Status    No documentation.                Psychosocial    No documentation.                Abuse/Neglect    No documentation.                Legal    No documentation.                Substance Abuse    No documentation.                Patient Forms    No documentation.                   Ashley Covarrubias, RN

## 2023-05-02 NOTE — THERAPY EVALUATION
Patient Name: Shayna Paula  : 1924    MRN: [id number]                              Today's Date: 2023       Admit Date: 2023    Visit Dx:     ICD-10-CM ICD-9-CM   1. Hypokalemia  E87.6 276.8   2. Leukocytosis, unspecified type  D72.829 288.60   3. Right sided abdominal pain  R10.9 789.09   4. Nausea and vomiting, unspecified vomiting type  R11.2 787.01     Patient Active Problem List   Diagnosis   • Hypokalemia     Past Medical History:   Diagnosis Date   • Breast cancer    • Fibromyalgia    • Hypertension      History reviewed. No pertinent surgical history.   General Information     Row Name 23 1109          Physical Therapy Time and Intention    Document Type evaluation  -     Mode of Treatment individual therapy;physical therapy  -     Row Name 23 1109          General Information    Patient Profile Reviewed yes  -LO     Prior Level of Function independent:;transfer;gait;community mobility;bed mobility  uses a rollator for indoor mobility and a WC for longer mobility  -     Existing Precautions/Restrictions fall;oxygen therapy device and L/min  -     Barriers to Rehab medically complex  -     Row Name 23 1109          Living Environment    People in Home alone  -     Row Name 23 1109          Home Main Entrance    Number of Stairs, Main Entrance none  -     Row Name 23 1109          Stairs Within Home, Primary    Number of Stairs, Within Home, Primary none  -     Row Name 23 1109          Cognition    Orientation Status (Cognition) oriented x 3;verbal cues/prompts needed for orientation  -     Row Name 23 1109          Safety Issues, Functional Mobility    Safety Issues Affecting Function (Mobility) awareness of need for assistance;impulsivity;insight into deficits/self-awareness;positioning of assistive device;sequencing abilities  -     Impairments Affecting Function (Mobility) balance;endurance/activity  tolerance;pain;strength;shortness of breath  -LO     Comment, Safety Issues/Impairments (Mobility) very nervous using FWW for mobility, might consider trial with a rollator if available  -LO           User Key  (r) = Recorded By, (t) = Taken By, (c) = Cosigned By    Initials Name Provider Type    Priscila Joe, ALYCIA Physical Therapist               Mobility     Row Name 05/02/23 1113          Bed Mobility    Comment, (Bed Mobility) UIC  -LO     Row Name 05/02/23 1113          Transfers    Comment, (Transfers) recliner<>FWW; vc required for HP and sequencing  -LO     Row Name 05/02/23 1113          Sit-Stand Transfer    Sit-Stand Emblem (Transfers) minimum assist (75% patient effort);verbal cues;nonverbal cues (demo/gesture)  -LO     Assistive Device (Sit-Stand Transfers) walker, front-wheeled  -LO     Row Name 05/02/23 1113          Gait/Stairs (Locomotion)    Emblem Level (Gait) moderate assist (50% patient effort);verbal cues;nonverbal cues (demo/gesture)  -LO     Assistive Device (Gait) walker, front-wheeled  -LO     Distance in Feet (Gait) 4  -LO     Deviations/Abnormal Patterns (Gait) bilateral deviations;base of support, wide;steppage;stride length decreased;[place] decreased;gait speed decreased  -LO     Bilateral Gait Deviations forward flexed posture;heel strike decreased  -LO     Comment, (Gait/Stairs) Very fearful ambulating. Ambulates with FWW with wide BRAD and short shuffling step with rigid pattern. Very unsteady with several losses of balance.  -LO           User Key  (r) = Recorded By, (t) = Taken By, (c) = Cosigned By    Initials Name Provider Type    Priscila Joe, ALYCIA Physical Therapist               Obj/Interventions     Row Name 05/02/23 1124          Range of Motion Comprehensive    General Range of Motion bilateral lower extremity ROM WNL  -LO     Row Name 05/02/23 1124          Strength Comprehensive (MMT)    General Manual Muscle Testing (MMT) Assessment lower extremity strength  deficits identified  -LO     Comment, General Manual Muscle Testing (MMT) Assessment BLE grossly 4/5  -LO     Row Name 05/02/23 1124          Motor Skills    Motor Skills functional endurance  -LO     Functional Endurance fair  -     Row Name 05/02/23 1124          Balance    Balance Assessment sitting static balance;sitting dynamic balance;standing static balance;standing dynamic balance  -LO     Static Sitting Balance supervision  -LO     Dynamic Sitting Balance supervision  -LO     Position, Sitting Balance supported;sitting in chair  -LO     Static Standing Balance minimal assist;verbal cues;non-verbal cues (demo/gesture)  -LO     Dynamic Standing Balance moderate assist;verbal cues;non-verbal cues (demo/gesture)  -LO     Position/Device Used, Standing Balance supported;walker, rolling  -LO     Comment, Balance FWW and Cruz for safety in standing  -LO     Row Name 05/02/23 1124          Sensory Assessment (Somatosensory)    Sensory Assessment (Somatosensory) LE sensation intact  -           User Key  (r) = Recorded By, (t) = Taken By, (c) = Cosigned By    Initials Name Provider Type    LO Priscila Saleem, PT Physical Therapist               Goals/Plan     Row Name 05/02/23 1128          Bed Mobility Goal 1 (PT)    Activity/Assistive Device (Bed Mobility Goal 1, PT) rolling to left;rolling to right;scooting;sit to supine;supine to sit  -LO     [place] Level/Cues Needed (Bed Mobility Goal 1, PT) independent  -LO     Time Frame (Bed Mobility Goal 1, PT) long term goal (LTG);10 days  -     Row Name 05/02/23 1128          Transfer Goal 1 (PT)    Activity/Assistive Device (Transfer Goal 1, PT) sit-to-stand/stand-to-sit;bed-to-chair/chair-to-bed;car transfer  -LO     [place] Level/Cues Needed (Transfer Goal 1, PT) modified independence  -LO     Time Frame (Transfer Goal 1, PT) long term goal (LTG);10 days  -     Row Name 05/02/23 1128          Gait Training Goal 1 (PT)    Activity/Assistive Device (Gait  Training Goal 1, PT) gait (walking locomotion);assistive device use;decrease fall risk;diminish gait deviation;improve balance and speed;increase endurance/gait distance;walker, rolling  -LO     [place] Level (Gait Training Goal 1, PT) contact guard required  -LO     Distance (Gait Training Goal 1, PT) 30  -LO     Time Frame (Gait Training Goal 1, PT) long term goal (LTG);10 days  -LO     Row Name 05/02/23 1120          Therapy Assessment/Plan (PT)    Planned Therapy Interventions (PT) balance training;gait training;neuromuscular re-education;patient/family education;bed mobility training;strengthening;transfer training;home exercise program  -LO           User Key  (r) = Recorded By, (t) = Taken By, (c) = Cosigned By    Initials Name Provider Type    Priscila Joe, PT Physical Therapist               Clinical Impression     Row Name 05/02/23 1123          Pain    Additional Documentation Pain Scale: FACES Pre/Post-Treatment (Group)  -     Row Name 05/02/23 1129          Pain Scale: FACES Pre/Post-Treatment    Pain: FACES Scale, Pretreatment 2-->hurts little bit  -LO     Posttreatment Pain Rating 2-->hurts little bit  -LO     Pain Location - Side/Orientation Bilateral  -LO     Pain Location - hip  -LO     Pre/Posttreatment Pain Comment chronic general arthritis  -LO     Row Name 05/02/23 1128          Plan of Care Review    Plan of Care Reviewed With patient  -LO     Outcome Evaluation PT eval completed. Patient demonstrating deficits in general BLE strength, standing static/dynamic balance, gait quality, and functional endurance effecting functional mobility below baseline. Patient will benefit from skilled IP PT services to address impairments for return to [facility]. Recommend SNF at [place].  -     Row Name 05/02/23 1121          Therapy Assessment/Plan (PT)    Patient/Family Therapy Goals Statement (PT) home  -LO     Rehab Potential (PT) good, to achieve stated therapy goals  -LO     Criteria for Skilled  Interventions Met (PT) yes;meets criteria;skilled treatment is necessary  -LO     Therapy Frequency (PT) daily  -LO     Row Name 05/02/23 1125          Vital Signs    Pre Systolic BP Rehab 168  -LO     Pre Treatment Diastolic BP 76  -LO     Pretreatment Heart Rate (beats/min) 69  -LO     Pre SpO2 (%) 98  -LO     O2 Delivery Pre Treatment nasal cannula  -LO     O2 Delivery Intra Treatment nasal cannula  -LO     O2 Delivery Post Treatment nasal cannula  -LO     Pre Patient Position Sitting  -LO     Intra Patient Position Standing  -LO     Post Patient Position Sitting  -LO     Row Name 05/02/23 1125          Positioning and Restraints    Pre-Treatment Position sitting in chair/recliner  -LO     Post Treatment Position chair  -LO     In Chair notified nsg;reclined;call light within reach;encouraged to call for assist;exit alarm on;waffle cushion;heels elevated  -LO           User Key  (r) = Recorded By, (t) = Taken By, (c) = Cosigned By    Initials Name Provider Type    Priscila Joe, ALYCIA Physical Therapist               Outcome Measures     Row Name 05/02/23 1130          How much help from another person do you currently need...    Turning from your back to your side while in flat bed without using bedrails? 3  -LO     Moving from lying on back to sitting on the side of a flat bed without bedrails? 3  -LO     Moving to and from a bed to a chair (including a wheelchair)? 3  -LO     Standing up from a chair using your arms (e.g., wheelchair, bedside chair)? 3  -LO     Climbing 3-5 steps with a railing? 1  -LO     To walk in hospital room? 2  -LO     AM-PAC 6 Clicks Score (PT) 15  -LO     Highest level of mobility 4 --> Transferred to chair/commode  -LO     Row Name 05/02/23 1130          Functional Assessment    Outcome Measure Options AM-PAC 6 Clicks Basic Mobility (PT)  -LO           User Key  (r) = Recorded By, (t) = Taken By, (c) = Cosigned By    Initials Name Provider Type    Priscila Joe PT Physical  Therapist                             Physical Therapy Education     Title: PT OT SLP Therapies (Done)     Topic: Physical Therapy (Done)     Point: Mobility training (Done)     Learning Progress Summary           Patient Acceptance, E, VU,NR by  at 5/2/2023 1043    Comment: PT POC                   Point: Home exercise program (Done)     Learning Progress Summary           Patient Acceptance, E, VU,NR by  at 5/2/2023 1043    Comment: PT POC                   Point: Body mechanics (Done)     Learning Progress Summary           Patient Acceptance, E, VU,NR by  at 5/2/2023 1043    Comment: PT POC                   Point: Precautions (Done)     Learning Progress Summary           Patient Acceptance, E, VU,NR by  at 5/2/2023 1043    Comment: PT POC                               User Key     Initials Effective Dates Name Provider Type Discipline     06/16/21 -  Priscila Saleem, ALYCIA Physical Therapist PT              PT Recommendation and Plan  Planned Therapy Interventions (PT): balance training, gait training, neuromuscular re-education, patient/family education, bed mobility training, strengthening, transfer training, home exercise program  Plan of Care Reviewed With: patient  Outcome Evaluation: PT eval completed. Patient demonstrating deficits in general BLE strength, standing static/dynamic balance, gait quality, and functional endurance effecting functional mobility below baseline. Patient will benefit from skilled IP PT services to address impairments for return to [facility]. Recommend SNF at [place].     Time Calculation:    PT Charges     Row Name 05/02/23 1043             Time Calculation    Start Time 1043  -LO      PT Received On 05/02/23  -      PT Goal Re-Cert Due Date 05/12/23  -         Timed Charges    51896 - Gait Training Minutes  8  -LO      54947 - PT Therapeutic Activity Minutes 12  -LO         Untimed Charges    PT Eval/Re-eval Minutes 38  -LO         Total Minutes    Timed Charges Total Minutes 20   -LO      Untimed Charges Total Minutes 38  -LO       Total Minutes 58  -LO            User Key  (r) = Recorded By, (t) = Taken By, (c) = Cosigned By    Initials Name Provider Type    Priscila Joe, PT Physical Therapist              Therapy Charges for Today     Code Description Service Date Service Provider Modifiers Qty    05426813229 HC PT THERAPEUTIC ACT EA 15 MIN 5/2/2023 Priscila Saleem, PT GP 1    71815656517 HC PT EVAL LOW COMPLEXITY 3 5/2/2023 Priscila Saleem, PT GP 1          PT G-Codes  Outcome Measure Options: AM-PAC 6 Clicks Basic Mobility (PT)  AM-PAC 6 Clicks Score (PT): 15  PT Discharge Summary  Anticipated Discharge Disposition (PT): skilled nursing facility    Priscila Saleem, PT  5/2/2023

## 2023-05-03 VITALS
HEIGHT: 64 IN | TEMPERATURE: 98.6 F | RESPIRATION RATE: 17 BRPM | SYSTOLIC BLOOD PRESSURE: 151 MMHG | WEIGHT: 140 LBS | BODY MASS INDEX: 23.9 KG/M2 | HEART RATE: 63 BPM | DIASTOLIC BLOOD PRESSURE: 71 MMHG | OXYGEN SATURATION: 96 %

## 2023-05-03 PROBLEM — I10 PRIMARY HYPERTENSION: Status: ACTIVE | Noted: 2023-05-03

## 2023-05-03 LAB
ANION GAP SERPL CALCULATED.3IONS-SCNC: 9 MMOL/L (ref 5–15)
BASOPHILS # BLD AUTO: 0.03 10*3/MM3 (ref 0–0.2)
BASOPHILS NFR BLD AUTO: 0.3 % (ref 0–1.5)
BUN SERPL-MCNC: 20 MG/DL (ref 8–23)
BUN/CREAT SERPL: 22.2 (ref 7–25)
CALCIUM SPEC-SCNC: 8.8 MG/DL (ref 8.2–9.6)
CHLORIDE SERPL-SCNC: 98 MMOL/L (ref 98–107)
CO2 SERPL-SCNC: 27 MMOL/L (ref 22–29)
CREAT SERPL-MCNC: 0.9 MG/DL (ref 0.57–1)
DEPRECATED RDW RBC AUTO: 47.3 FL (ref 37–54)
EGFRCR SERPLBLD CKD-EPI 2021: 57.9 ML/MIN/1.73
EOSINOPHIL # BLD AUTO: 0.15 10*3/MM3 (ref 0–0.4)
EOSINOPHIL NFR BLD AUTO: 1.3 % (ref 0.3–6.2)
ERYTHROCYTE [DISTWIDTH] IN BLOOD BY AUTOMATED COUNT: 14.2 % (ref 12.3–15.4)
GLUCOSE SERPL-MCNC: 103 MG/DL (ref 65–99)
HCT VFR BLD AUTO: 30.8 % (ref 34–46.6)
HGB BLD-MCNC: 10.4 G/DL (ref 12–15.9)
IMM GRANULOCYTES # BLD AUTO: 0.1 10*3/MM3 (ref 0–0.05)
IMM GRANULOCYTES NFR BLD AUTO: 0.9 % (ref 0–0.5)
LYMPHOCYTES # BLD AUTO: 1.22 10*3/MM3 (ref 0.7–3.1)
LYMPHOCYTES NFR BLD AUTO: 10.7 % (ref 19.6–45.3)
MCH RBC QN AUTO: 30.7 PG (ref 26.6–33)
MCHC RBC AUTO-ENTMCNC: 33.8 G/DL (ref 31.5–35.7)
MCV RBC AUTO: 90.9 FL (ref 79–97)
MONOCYTES # BLD AUTO: 1.39 10*3/MM3 (ref 0.1–0.9)
MONOCYTES NFR BLD AUTO: 12.1 % (ref 5–12)
NEUTROPHILS NFR BLD AUTO: 74.7 % (ref 42.7–76)
NEUTROPHILS NFR BLD AUTO: 8.56 10*3/MM3 (ref 1.7–7)
NRBC BLD AUTO-RTO: 0 /100 WBC (ref 0–0.2)
PLATELET # BLD AUTO: 370 10*3/MM3 (ref 140–450)
PMV BLD AUTO: 9.8 FL (ref 6–12)
POTASSIUM SERPL-SCNC: 4 MMOL/L (ref 3.5–5.2)
RBC # BLD AUTO: 3.39 10*6/MM3 (ref 3.77–5.28)
SODIUM SERPL-SCNC: 134 MMOL/L (ref 136–145)
WBC NRBC COR # BLD: 11.45 10*3/MM3 (ref 3.4–10.8)

## 2023-05-03 PROCEDURE — 80048 BASIC METABOLIC PNL TOTAL CA: CPT | Performed by: INTERNAL MEDICINE

## 2023-05-03 PROCEDURE — 99239 HOSP IP/OBS DSCHRG MGMT >30: CPT | Performed by: INTERNAL MEDICINE

## 2023-05-03 PROCEDURE — 85025 COMPLETE CBC W/AUTO DIFF WBC: CPT | Performed by: INTERNAL MEDICINE

## 2023-05-03 RX ORDER — AMLODIPINE BESYLATE 5 MG/1
5 TABLET ORAL DAILY
Status: DISCONTINUED | OUTPATIENT
Start: 2023-05-04 | End: 2023-05-03

## 2023-05-03 RX ORDER — LOSARTAN POTASSIUM 100 MG/1
100 TABLET ORAL
Qty: 30 TABLET | Refills: 0 | Status: SHIPPED | OUTPATIENT
Start: 2023-05-04

## 2023-05-03 RX ORDER — AMLODIPINE BESYLATE 5 MG/1
5 TABLET ORAL DAILY
Status: DISCONTINUED | OUTPATIENT
Start: 2023-05-03 | End: 2023-05-03

## 2023-05-03 RX ORDER — AMLODIPINE BESYLATE 2.5 MG/1
2.5 TABLET ORAL ONCE
Status: COMPLETED | OUTPATIENT
Start: 2023-05-03 | End: 2023-05-03

## 2023-05-03 RX ORDER — AMLODIPINE BESYLATE 5 MG/1
5 TABLET ORAL DAILY
Status: DISCONTINUED | OUTPATIENT
Start: 2023-05-04 | End: 2023-05-03 | Stop reason: HOSPADM

## 2023-05-03 RX ORDER — AMLODIPINE BESYLATE 10 MG/1
10 TABLET ORAL DAILY
Qty: 30 TABLET | Refills: 0 | Status: SHIPPED | OUTPATIENT
Start: 2023-05-04 | End: 2023-06-03

## 2023-05-03 RX ADMIN — PAROXETINE HYDROCHLORIDE 10 MG: 20 TABLET, FILM COATED ORAL at 08:45

## 2023-05-03 RX ADMIN — LEVOTHYROXINE SODIUM 75 MCG: 75 TABLET ORAL at 08:45

## 2023-05-03 RX ADMIN — AMLODIPINE BESYLATE 2.5 MG: 2.5 TABLET ORAL at 05:58

## 2023-05-03 RX ADMIN — AMLODIPINE BESYLATE 2.5 MG: 2.5 TABLET ORAL at 09:26

## 2023-05-03 RX ADMIN — LOSARTAN POTASSIUM 100 MG: 50 TABLET, FILM COATED ORAL at 08:45

## 2023-05-03 NOTE — DISCHARGE PLACEMENT REQUEST
"    NANI BARNEY, RN    P:  [phone]  F:  [phone]                      Shayna Zee (89 y.o. Female)     Date of Birth   11/16/1924    Social Security Number       Address   [address] [place] [address] [address] [place] [address]    Home Phone   [phone]    MRN   [id number]       Shinto   Judaism    Marital Status                               Admission Date   4/28/23    Admission Type   Emergency    Admitting Provider   Janette Perez MD    Attending Provider   Janette Perez MD    Department, Room/Bed   [facility] 4G, S452/1       Discharge Date       Discharge Disposition   Home or Self Care    Discharge Destination                               Attending Provider: Janette Perez MD    Allergies: Celebrex [Celecoxib], Fosamax [Alendronate], Keflex [Cephalexin], Codeine    Isolation: None   Infection: None   Code Status: No CPR    Ht: 162.6 cm (64\")   Wt: 63.5 kg (140 lb)    Admission Cmt: None   Principal Problem: Hypokalemia [E87.6]                 Active Insurance as of 4/28/2023     Primary Coverage     Payor Plan Insurance Group Employer/Plan Group    MEDICARE MEDICARE A & B      Payor Plan Address Payor Plan Phone Number Payor Plan Fax Number Effective Dates    [address] [phone]  11/1/1989 - None Entered    [facility] [address]       Subscriber Name Subscriber Birth Date Member ID       SHAYNA ZEE 11/16/1924 [id number]           Secondary Coverage     Payor Plan Insurance Group Employer/Plan Group    AAR MC SUP AAR HEALTH CARE OPTIONS      Payor Plan Address Payor Plan Phone Number Payor Plan Fax Number Effective Dates    [facility] [phone]  1/1/2023 - None Entered    [address]       [facility] [address]       Subscriber Name Subscriber Birth Date Member ID       SHAYNA ZEE 11/16/1924 [id number]                 Emergency Contacts      (Rel.) Home Phone Work Phone Mobile Phone    JASON AREVALO (Son) -- -- [phone]    " [place] -- -- [phone]                     [place] [address] [facility] [address]  Phone:  [phone]  Fax:  [phone] Date: May 3, 2023      Ambulatory Referral to Speech Therapy     Patient:  Shayna Paula MRN:  [id number]   [address] [address] :  1924  SSN:    Phone: [phone] Sex:  F      INSURANCE PAYOR PLAN GROUP # SUBSCRIBER ID   Primary:  Secondary:    MEDICARE  [employer] MC SUP [id number]  [id number]      [id number]  [id number]      Referring Provider Information:  JANETTE COLE Phone: [phone] Fax: [phone]       Referral Information:   # Visits:  1 Referral Type: Speech Pathology [AF1]   Urgency:  Routine Referral Reason: Specialty Services Required   Start Date: May 3, 2023 End Date:  To be determined by Insurer   Diagnosis: Hypokalemia (E87.6 [ICD-10-CM] 276.8 [ICD-9-CM])  Primary hypertension (I10 [ICD-10-CM] 401.9 [ICD-9-CM])      Refer to Dept:   Refer to Provider:   Refer to Provider Phone:   Refer to Facility:       Follow-up needed: Yes     This document serves as a request of services and does not constitute Insurance authorization or approval of services.  To determine eligibility, please contact the members Insurance carrier to verify and review coverage.     If you have medical questions regarding this request for services. Please contact [address] at [phone] during normal business hours.        Authorizing Provider:Janette Cole MD  Authorizing Provider's NPI: [id number]  Order Entered By: Ashley Covarrubias RN 5/3/2023  3:30 PM     Electronically signed by: Janette Cole MD 5/3/2023  3:30 PM                          [address]  [address] [facility] [address]  Phone:  [phone]  Fax:  [phone] Date: May 3, 2023      Ambulatory Referral to Occupational Therapy     Patient:  Shayna Paula MRN:  [id number]   [address] [facility]  [address] [place], [place] [address] :  1924  SSN:    Phone: [phone] Sex:  F      INSURANCE PAYOR PLAN GROUP # SUBSCRIBER ID   Primary:  Secondary:    MEDICARE AARP MC SUP [id number]  [id number]      [id number]  [id number]      Referring Provider Information:  JULES PEREZ Phone: [phone] Fax: [phone]       Referral Information:   # Visits:  1 Referral Type: Occupational Therapy [AD1]   Urgency:  Routine Referral Reason: Specialty Services Required   Start Date: May 3, 2023 End Date:  To be determined by Insurer   Diagnosis: Hypokalemia (E87.6 [ICD-10-CM] 276.8 [ICD-9-CM])  Primary hypertension (I10 [ICD-10-CM] 401.9 [ICD-9-CM])      Refer to Dept:   Refer to Provider:   Refer to Provider Phone:   Refer to Facility:       Specialty needed: Evaluate and treat  Follow-up needed: Yes     This document serves as a request of services and does not constitute Insurance authorization or approval of services.  To determine eligibility, please contact the members Insurance carrier to verify and review coverage.     If you have medical questions regarding this request for services. Please contact [address] at [phone] during normal business hours.        Authorizing Provider:Jules Perez MD  Authorizing Provider's NPI: [id number]  Order Entered By: Ashley Covarrubias RN 5/3/2023  3:30 PM     Electronically signed by: Jules Perez MD 5/3/2023  3:30 PM                            [facility]  [address] [place] [address]  Phone:  [phone]  Fax:  [phone] Date: May 3, 2023      Ambulatory Referral to Physical Therapy Evaluate and treat; (as tolerated)     Patient:  Shayna Paula MRN:  [id number]   [address] [facility] [address] [place], [place] [address] :  1924  SSN:    Phone: [phone] Sex:  F      INSURANCE PAYOR PLAN GROUP # SUBSCRIBER ID   Primary:  Secondary:    MEDICARE AARP MC SUP [id number]  [id number]       [id number]  [id number]      Referring Provider Information:  JANETTE PEREZ Phone: [phone] Fax: [phone]       Referral Information:   # Visits:  1 Referral Type: Physical Therapy [AE1]   Urgency:  Routine Referral Reason: Specialty Services Required   Start Date: May 3, 2023 End Date:  To be determined by Insurer   Diagnosis: Hypokalemia (E87.6 [ICD-10-CM] 276.8 [ICD-9-CM])  Primary hypertension (I10 [ICD-10-CM] 401.9 [ICD-9-CM])      Refer to Dept:   Refer to Provider:   Refer to Provider Phone:   Refer to Facility:       Specialty needed: Evaluate and treat  Weight Bearing Status:  (as tolerated)  Follow-up needed: Yes     This document serves as a request of services and does not constitute Insurance authorization or approval of services.  To determine eligibility, please contact the members Insurance carrier to verify and review coverage.     If you have medical questions regarding this request for services. Please contact [address] at [phone] during normal business hours.        Authorizing Provider:Janette Perez MD  Authorizing Provider's NPI: [id number]  Order Entered By: Ashley Covarrubias RN 5/3/2023  3:30 PM     Electronically signed by: Janette Perez MD 5/3/2023  3:30 PM                                                    History & Physical      Raman Ordoñez III, DO at 23 2156              [facility] Medicine Services  HISTORY AND PHYSICAL    Patient Name: Shayna Paula  : 1924  MRN: [id number]  Primary Care Physician: Riky Sue DO  Date of admission: 2023      Subjective    Subjective     Chief Complaint:  Weakness, fatigue    HPI:  Shayna Paula is a 89 y.o. female who states that for the last 3 days she has noticed increased lethargy and weakness, also increased fatigue.  Yesterday she had 2 bouts of nausea with emesis.  The patient's daughter-in-law is with her in the ED room during my visit and she  states also that the patient has had such fatigue that she is less interactive over the last 3 days, though there was some improvement today; she states the patient seems much better after treatment in the ED.  The patient went to see her PCP today who akhil routine labs which returned a white count of 20 and potassium of 2.3.  She was instructed to come to the ED for further evaluation.  Her labs demonstrated continued hypokalemia and she received oral replacement here.  She denies any muscle spasms, palpitations, chest pain, shortness of breath, fever/chills, or syncope.  Her medical history is significant for hypertension, glaucoma, breast cancer (treatment complete), GERD, and hypothyroidism.        Review of Systems   Constitutional: Positive for fatigue.   HENT: Negative.    Eyes: Negative.    Respiratory: Negative.    Cardiovascular: Negative.    Gastrointestinal: Positive for nausea and vomiting.   Endocrine: Negative.    Genitourinary: Negative.    Musculoskeletal: Negative.    Skin: Negative.    Allergic/Immunologic: Negative.    Neurological: Positive for weakness.   Hematological: Negative.    Psychiatric/Behavioral:        As in HPI          Personal History     Past Medical History:   Diagnosis Date   • Breast cancer    • Fibromyalgia    • Hypertension              History reviewed. No pertinent surgical history.    Family History: Mother had dementia, father  of cancer.    Social History:  reports that she has never smoked. She does not have any smokeless tobacco history on file. She reports that she does not drink alcohol and does not use drugs.  Social History     Social History Narrative   • Not on file       Medications:  Available home medication information reviewed.  (Not in a hospital admission)      Allergies   Allergen Reactions   • Codeine GI Intolerance       Objective    Objective     Vital Signs:   Temp:  [98.7 °F (37.1 °C)] 98.7 °F (37.1 °C)  Heart Rate:  [73-79] 75  Resp:  [22]  22  BP: (139-203)/() 203/94  Flow (L/min):  [2] 2       Physical Exam   Constitutional: Awake, alert  Eyes: PERRLA, sclerae anicteric, no conjunctival injection  HENT: NCAT, mucous membranes moist  Neck: Supple, no thyromegaly, no lymphadenopathy, trachea midline  Respiratory: Clear to auscultation bilaterally, nonlabored respirations   Cardiovascular: RRR, no murmurs, rubs, or gallops, palpable pedal pulses bilaterally  Gastrointestinal: Positive bowel sounds, soft, nontender, nondistended  Musculoskeletal: No bilateral ankle edema, no clubbing or cyanosis to extremities  Psychiatric: Appropriate affect, cooperative  Neurologic: Oriented x 3, strength symmetric in all extremities, Cranial Nerves grossly intact to confrontation, speech clear  Skin: No rashes        Result Review:  I have personally reviewed the results from the time of this admission to 4/28/2023 21:56 EDT and agree with these findings:  [x]  Laboratory list / accordion  []  Microbiology  [x]  Radiology  [x]  EKG/Telemetry   []  Cardiology/Vascular   []  Pathology  []  Old records  []  Other:  Most notable findings include: Reviewed CT abdomen/pelvis radiology read.  Reviewed EKG which by my read shows normal sinus rhythm, ventricular rate approximately 70 bpm,  left axis, nonspecific ST/T wave changes.        LAB RESULTS:      Lab 04/28/23 1600 04/28/23 1551 04/28/23  1549   WBC  --   --  20.19*   HEMOGLOBIN  --   --  13.2   HEMOGLOBIN, POC 13.6 13.6  --    HEMATOCRIT  --   --  37.4   HEMATOCRIT POC 40 40  --    PLATELETS  --   --  380   NEUTROS ABS  --   --  18.04*   IMMATURE GRANS (ABS)  --   --  0.10*   LYMPHS ABS  --   --  1.05   MONOS ABS  --   --  0.97*   EOS ABS  --   --  0.01   MCV  --   --  86.4   PROCALCITONIN  --   --  0.15   LACTATE  --   --  1.3         Lab 04/28/23  1600 04/28/23  1551 04/28/23  1549   SODIUM  --   --  133*   POTASSIUM  --   --  2.6*   CHLORIDE  --   --  90*   CO2  --   --  30.0*   ANION GAP  --   --  13.0    BUN  --   --  19   CREATININE 0.90 0.90 0.93   EGFR  --  57.9* 55.7*   GLUCOSE  --   --  149*   CALCIUM  --   --  8.9   MAGNESIUM  --   --  2.2         Lab 04/28/23  1549   TOTAL PROTEIN 7.1   ALBUMIN 3.6   GLOBULIN 3.5   ALT (SGPT) 18   AST (SGOT) 21   BILIRUBIN 0.7   ALK PHOS 81   LIPASE 16                     UA        4/28/2023    19:38   Urinalysis   Squamous Epithelial Cells, UA 3-6     Specific Gravity, UA 1.021     Ketones, UA Negative     Blood, UA Negative     Leukocytes, UA Negative     Nitrite, UA Negative     RBC, UA 0-2     WBC, UA 3-5     Bacteria, UA 1+         Microbiology Results (last 10 days)     ** No results found for the last 240 hours. **          CT Abdomen Pelvis Without Contrast    Result Date: 4/28/2023  CT ABDOMEN PELVIS WO CONTRAST Date of Exam: 4/28/2023 5:52 PM EDT Indication: right side abd pain, vomiting, may have a mass. Comparison: None available. Technique: Axial CT images were obtained of the abdomen and pelvis without the administration of contrast. Reconstructed coronal and sagittal images were also obtained. Automated exposure control and iterative construction methods were used. Findings: The lung bases are grossly clear. Unremarkable appearance of the liver, gallbladder, bile ducts, spleen. There is hazy peripancreatic fat stranding with otherwise unremarkable appearance of the pancreas; there is thin nonorganized peripancreatic fluid along the inferior margin of the tail (series 901 image 88). Normal appearance of the adrenal glands, kidneys, ureters, and bladder. Fibroid uterus. There are small bilateral ovarian cysts measuring up to 2.8 cm on the right. There is severe sigmoid colonic diverticulosis without evidence of diverticulitis. Normal appendix and terminal ileum. No evidence of bowel obstruction. There is mild fluid distention of the stomach with fluid likely in the distal esophagus. No abdominopelvic free fluid. No pneumoperitoneum. There is atherosclerosis of  the abdominal aorta without evidence of aneurysm. No lymphadenopathy. The body wall soft tissues are unremarkable. The bones are demineralized without acute or suspicious bony findings.     Impression: Impression: Hazy peripancreatic fat stranding and thin nonorganized peripancreatic fluid, findings compatible with acute interstitial pancreatitis and correlate with lipase. Mild fluid distention of the stomach with some fluid in the distal esophagus which predisposes the patient to aspiration. Fibroid uterus. Sigmoid colonic diverticulosis without diverticulitis. Electronically Signed: Joaquin Rubalcava  4/28/2023 6:14 PM EDT  Workstation ID: [id number]          Assessment & Plan   Assessment & Plan     Active Hospital Problems    Diagnosis  POA   • **Hypokalemia [E87.6]  Yes       89F with hypokalemia, generalized weakness, fatigue    Hypokalemia  - This was addressed in the ED with oral replacement.  - She is on electrolyte replacement protocol.  - We will recheck potassium level later on tonight.  - Continue telemetry monitoring.  - Hold HCTZ from home regimen.    Hypertension  - Continue home losartan from home regimen.  - Hold HCTZ while receiving IV fluids.  - Continue Norvasc for management.    Hypothyroidism  - Continue Synthroid from home regimen.  - Check TSH, free T4.    History of breast cancer  - No acute issues; treatment for this is complete.    Glaucoma  - Continue home Xalatan drops.    GERD  - We will add daily oral Pepcid.      Total time spent: 79 minutes  Time spent includes time reviewing chart, face-to-face time, counseling patient/family/caregiver, ordering medications/tests/procedures, communicating with other health care professionals, documenting clinical information in the electronic health record, and coordination of care.    DVT prophylaxis:  scds      CODE STATUS:  full  Code Status and Medical Interventions:   Ordered at: 04/28/23 2029     Level Of Support Discussed With:    Patient      Code Status (Patient has no pulse and is not breathing):    CPR (Attempt to Resuscitate)     Medical Interventions (Patient has pulse or is breathing):    Full Support       Expected Discharge tbd  No data recorded     Raman Ordoñez III, DO  23    Electronically signed by Raman Ordoñez III, DO at 23 2924          Physician Progress Notes (most recent note)      Codey Arguelles MD at 23 1647              [facility]  PROGRESS NOTE    Patient Name: Shayna Paula  : 1924  MRN: [id number]    Date of Admission: 2023  Primary Care Physician: Riky Sue DO    Subjective   Subjective     CC:  Weakness    HPI:  Resting in bed in no acute distress and feels much better today.  Tells me that she feels stronger and is almost back to her baseline.  No fever or chills.  No chest pain or palpitation.  No nausea vomiting or diarrhea.  No headache.    ROS:       Objective   Objective     Vital Signs:   Temp:  [97.5 °F (36.4 °C)-98.3 °F (36.8 °C)] 97.5 °F (36.4 °C)  Heart Rate:  [60-73] 64  Resp:  [15-18] 15  BP: (149-155)/(60-64) 152/60  Flow (L/min):  [2] 2     Physical Exam:  Constitutional: No acute distress  HENT: NCAT, mucous membranes moist  Respiratory: Clear to auscultation bilaterally, respiratory effort normal   Cardiovascular: RRR, no murmurs, rubs, or gallops  Gastrointestinal: Positive bowel sounds, soft, nontender, nondistended  Musculoskeletal: No bilateral ankle edema  Psychiatric: Appropriate affect, cooperative  Neurologic: Awake, alert, oriented x3, speech clear  Skin: No rashes    Results Reviewed:  LAB RESULTS:      Lab 23  0612 23  1418 23  0508 23  1600 23  1551 23  1549   WBC 14.09*  --  17.90*  --   --  20.19*   HEMOGLOBIN 10.7* 10.4* 12.3  --   --  13.2   HEMOGLOBIN, POC  --   --   --  13.6 13.6  --    HEMATOCRIT 31.7* 30.8* 36.3  --   --  37.4   HEMATOCRIT POC  --    --   --  40 40  --    PLATELETS 358  --  330  --   --  380   NEUTROS ABS 11.06*  --  15.30*  --   --  18.04*   IMMATURE GRANS (ABS) 0.09*  --  0.13*  --   --  0.10*   LYMPHS ABS 1.16  --  1.03  --   --  1.05   MONOS ABS 1.60*  --  1.37*  --   --  0.97*   EOS ABS 0.13  --  0.03  --   --  0.01   MCV 90.3  --  89.9  --   --  86.4   PROCALCITONIN  --   --   --   --   --  0.15   LACTATE  --   --   --   --   --  1.3         Lab 05/02/23  0612 05/01/23  0407 04/30/23  1408 04/29/23  2213 04/29/23  1216 04/29/23  0508 04/28/23  2231 04/28/23  1600 04/28/23  1551 04/28/23  1549   SODIUM 131*  --   --   --   --  136 136  --   --  133*   POTASSIUM 4.8 4.1 3.5 3.6 3.5 3.0* 3.3*  --   --  2.6*   CHLORIDE 95*  --   --   --   --  98 95*  --   --  90*   CO2 28.0  --   --   --   --  27.0 30.0*  --   --  30.0*   ANION GAP 8.0  --   --   --   --  11.0 11.0  --   --  13.0   BUN 24*  --   --   --   --  15 17  --   --  19   CREATININE 0.96  --   --   --   --  0.72 0.86 0.90 0.90 0.93   EGFR 53.6*  --   --   --   --  75.7 61.1  --  57.9* 55.7*   GLUCOSE 103*  --   --   --   --  108* 113*  --   --  149*   CALCIUM 8.4  --   --   --   --  8.4 8.8  --   --  8.9   MAGNESIUM 2.0  --   --   --   --  2.1  --   --   --  2.2   PHOSPHORUS 3.5  --   --   --   --   --   --   --   --   --          Lab 04/29/23  0508 04/28/23  1549   TOTAL PROTEIN 6.4 7.1   ALBUMIN 3.2* 3.6   GLOBULIN 3.2 3.5   ALT (SGPT) 11 18   AST (SGOT) 12 21   BILIRUBIN 0.8 0.7   ALK PHOS 83 81   LIPASE  --  16                     Brief Urine Lab Results  (Last result in the past 365 days)      Color   Clarity   Blood   Leuk Est   Nitrite   Protein   CREAT   Urine HCG        04/28/23 1938 Yellow   Clear   Negative   Negative   Negative   100 mg/dL (2+)                 Microbiology Results Abnormal     None          No radiology results from the last 24 hrs        Current medications:  Scheduled Meds:amLODIPine, 2.5 mg, Oral, Q12H  latanoprost, 1 drop, Both Eyes,  Nightly  levothyroxine, 75 mcg, Oral, Daily  [START ON 5/3/2023] losartan, 100 mg, Oral, Q24H  PARoxetine, 10 mg, Oral, Daily  sodium chloride, 10 mL, Intravenous, Q12H      Continuous Infusions:   PRN Meds:.•  acetaminophen  •  Magnesium Standard Dose Replacement - Follow Nurse / BPA Driven Protocol  •  ondansetron  •  Potassium Replacement - Follow Nurse / BPA Driven Protocol  •  sodium chloride  •  sodium chloride    Assessment & Plan   Assessment & Plan     Active Hospital Problems    Diagnosis  POA   • **Hypokalemia [E87.6]  Yes      Resolved Hospital Problems   No resolved problems to display.        Brief Hospital Course to date:  Shayna Paula is a 89 y.o. female with past medical history significant for hypertension, glucoma, history of breast cancer, hypothyroidism, GERD.  Patient was admitted for progressive generalized weakness and hypokalemia.    *Hypokalemia  - Replace per protocol and monitor.  --Seems to be stable     Hypertension  - home losartan and Norvasc was continued.  However, patient still had elevated blood pressure.  We started patient on clonidine patch.  Then blood pressure went on the low side and Norvasc and losartan were held.  Then we discontinued clonidine patch and just observe.  The next day gradually blood pressure came up.  Today we restarted the patient on losartan and also amlodipine.    Hypothyroidism  - Continue Synthroid from home regimen.     History of breast cancer  - No acute issues; treatment for this is complete.     Glaucoma  - Continue home Xalatan drops.     GERD  - We will add daily oral Pepcid.    Leukocytosis  -Patient has been afebrile and there is no sign of infection.  -WBC has been coming down.  We will recheck tomorrow.  If it is elevated then repeat chest x-ray and also UA could be considered.      Expected Discharge Location and Transportation: Home  Expected Discharge 1-2 days  Expected Discharge Date: 5/3/2023; Expected Discharge Time:      DVT  prophylaxis:  Mechanical DVT prophylaxis orders are present.     AM-PAC 6 Clicks Score (PT): 15 (05/02/23 1130)    CODE STATUS:   Code Status and Medical Interventions:   Ordered at: 04/28/23 7227     Level Of Support Discussed With:    Patient     Code Status (Patient has no pulse and is not breathing):    No CPR (Do Not Attempt to Resuscitate)     Medical Interventions (Patient has pulse or is breathing):    Full Support     Comments:    DNR/DNI in the event of respiratory and/or cardiac arrest.       Codey Arguelles MD  05/02/23        Electronically signed by Codey Arguelles MD at 05/02/23 1009

## 2023-05-03 NOTE — PLAN OF CARE
Problem: Skin Injury Risk Increased  Goal: Skin Health and Integrity  Outcome: Ongoing, Progressing     Problem: Fall Injury Risk  Goal: Absence of Fall and Fall-Related Injury  Outcome: Ongoing, Progressing   Goal Outcome Evaluation:  Plan of Care Reviewed With: patient        Progress: no change

## 2023-05-03 NOTE — CASE MANAGEMENT/SOCIAL WORK
Continued Stay Note  [facility]     Patient Name: Shayna Paula  MRN: [id number]  Today's Date: 5/3/2023    Admit Date: 4/28/2023    Plan: Home   Discharge Plan     Row Name 05/03/23 1531       Plan    Plan Comments Faxed OP PT/OT/SLP orders to [facility] ([phone]) per pt/family request.               Discharge Codes    No documentation.               Expected Discharge Date and Time     Expected Discharge Date Expected Discharge Time    May 3, 2023             Ashley Covarrubias, RN

## 2023-05-03 NOTE — CASE MANAGEMENT/SOCIAL WORK
Continued Stay Note  [facility]     Patient Name: Shayna Paula  MRN: [id number]  Today's Date: 5/3/2023    Admit Date: 4/28/2023    Plan: Home   Discharge Plan     Row Name 05/03/23 0927       Plan    Plan Home    Patient/Family in Agreement with Plan yes    Plan Comments Spoke with Bernice at [facility] [place].  They have offered a rehab bed.  Discussed bed offer with pt and DIL.  They now prefer for pt to return home upon DC.  They plan to arrange for 24/7 in-home help (short-term) and have pt participate with therapy at [facility].  No other needs identified/voiced at present.  CM will cont to follow.    Final Discharge Disposition Code 01 - home or self-care               Discharge Codes    No documentation.               Expected Discharge Date and Time     Expected Discharge Date Expected Discharge Time    May 3, 2023             Ashley Covarrubias RN

## 2023-05-04 ENCOUNTER — READMISSION MANAGEMENT (OUTPATIENT)
Dept: CALL CENTER | Facility: HOSPITAL | Age: 88
End: 2023-05-04
Payer: MEDICARE

## 2023-05-04 NOTE — OUTREACH NOTE
Prep Survey    Flowsheet Row Responses   [facility] facility patient discharged from? [facility]   Is LACE score < 7 ? No   Eligibility Readm Mgmt   Discharge diagnosis Hypokalemia   Does the patient have one of the following disease processes/diagnoses(primary or secondary)? Other   Does the patient have Home health ordered? No   Is there a DME ordered? No   Prep survey completed? Yes          Vickie RICE - Registered Nurse

## 2023-05-04 NOTE — DISCHARGE SUMMARY
[facility] Medicine Services  DISCHARGE SUMMARY    Patient Name: Shayna Paula  : 1924  MRN: [id number]    Date of Admission: 2023  5:00 PM  Date of Discharge:  2023  Primary Care Physician: Riky Sue,     Consults     No orders found from 3/30/2023 to 2023.          Hospital Course     Presenting Problem:   Hypokalemia [E87.6]    Active Hospital Problems    Diagnosis  POA   • **Hypokalemia [E87.6]  Yes   • Primary hypertension [I10]  Yes      Resolved Hospital Problems   No resolved problems to display.          Hospital Course:  Shayna Paula is a 89 y.o. female w /o HTN, hypothyroidism who presented with weakness 2/2 severe hypokalemia.  Severe hypokalemia, replaced. Thought to be 2/2 HCTZ which was stopped with normokalemia thereafter.   Continued on losartan + norvasc (increased dose). More lenient with BP goals given very advanced age.  Follow-up with PCP 1 week with blood pressure logs and repeat check on hypokalemia.     Discharge Follow Up Recommendations for outpatient labs/diagnostics:  F/u PCP 1 week    Day of Discharge     HPI:   Doing well today - anxious for home if able. Asx from BP's in 160's lying flat    Review of Systems  Gen- No fevers, chills  CV- No chest pain, palpitations  Resp- No cough, dyspnea    Vital Signs:   Temp:  [98.1 °F (36.7 °C)-98.6 °F (37 °C)] 98.6 °F (37 °C)  Heart Rate:  [53-75] 63  Resp:  [17] 17  BP: (149-168)/(59-73) 151/71  Flow (L/min):  [2] 2    Physical Exam:  Constitutional: No acute distress, awake, alert  HENT: NCAT, mucous membranes moist  Respiratory: Clear to auscultation bilaterally, respiratory effort normal   Cardiovascular: RRR, no murmurs, rubs, or gallops  Gastrointestinal: Soft, nontender, nondistended  Musculoskeletal: Muscle tone decreased c/w age, no joint effusions appreciated  Psychiatric: Appropriate affect, cooperative  Neurologic: Alert and oriented, facial movements symmetric and  spontaneous movement of all 4 extremities grossly equal bilaterally, speech clear  Skin: No rashes    Pertinent  and/or Most Recent Results     LAB RESULTS:      Lab 05/03/23  0558 05/02/23  0612 05/01/23  1418 04/29/23  0508 04/28/23  1600 04/28/23  1551 04/28/23  1549   WBC 11.45* 14.09*  --  17.90*  --   --  20.19*   HEMOGLOBIN 10.4* 10.7* 10.4* 12.3  --   --  13.2   HEMOGLOBIN, POC  --   --   --   --  13.6   < >  --    HEMATOCRIT 30.8* 31.7* 30.8* 36.3  --   --  37.4   HEMATOCRIT POC  --   --   --   --  40   < >  --    PLATELETS 370 358  --  330  --   --  380   NEUTROS ABS 8.56* 11.06*  --  15.30*  --   --  18.04*   IMMATURE GRANS (ABS) 0.10* 0.09*  --  0.13*  --   --  0.10*   LYMPHS ABS 1.22 1.16  --  1.03  --   --  1.05   MONOS ABS 1.39* 1.60*  --  1.37*  --   --  0.97*   EOS ABS 0.15 0.13  --  0.03  --   --  0.01   MCV 90.9 90.3  --  89.9  --   --  86.4   PROCALCITONIN  --   --   --   --   --   --  0.15   LACTATE  --   --   --   --   --   --  1.3    < > = values in this interval not displayed.         Lab 05/03/23  0558 05/02/23  0612 05/01/23  0407 04/30/23  1408 04/29/23  2213 04/29/23  1216 04/29/23  0508 04/28/23  2231 04/28/23  1600 04/28/23  1551 04/28/23  1549 04/28/23  1549   SODIUM 134* 131*  --   --   --   --  136 136  --   --   --  133*   POTASSIUM 4.0 4.8 4.1 3.5 3.6   < > 3.0* 3.3*  --   --   --  2.6*   CHLORIDE 98 95*  --   --   --   --  98 95*  --   --   --  90*   CO2 27.0 28.0  --   --   --   --  27.0 30.0*  --   --   --  30.0*   ANION GAP 9.0 8.0  --   --   --   --  11.0 11.0  --   --   --  13.0   BUN 20 24*  --   --   --   --  15 17  --   --   --  19   CREATININE 0.90 0.96  --   --   --   --  0.72 0.86 0.90 0.90   < > 0.93   EGFR 57.9* 53.6*  --   --   --   --  75.7 61.1  --  57.9*  --  55.7*   GLUCOSE 103* 103*  --   --   --   --  108* 113*  --   --   --  149*   CALCIUM 8.8 8.4  --   --   --   --  8.4 8.8  --   --   --  8.9   MAGNESIUM  --  2.0  --   --   --   --  2.1  --   --   --   --   2.2   PHOSPHORUS  --  3.5  --   --   --   --   --   --   --   --   --   --     < > = values in this interval not displayed.         Lab 04/29/23  0508 04/28/23  1549   TOTAL PROTEIN 6.4 7.1   ALBUMIN 3.2* 3.6   GLOBULIN 3.2 3.5   ALT (SGPT) 11 18   AST (SGOT) 12 21   BILIRUBIN 0.8 0.7   ALK PHOS 83 81   LIPASE  --  16                     Brief Urine Lab Results  (Last result in the past 365 days)      Color   Clarity   Blood   Leuk Est   Nitrite   Protein   CREAT   Urine HCG        04/28/23 1938 Yellow   Clear   Negative   Negative   Negative   100 mg/dL (2+)               Microbiology Results (last 10 days)     ** No results found for the last 240 hours. **          CT Abdomen Pelvis Without Contrast    Result Date: 4/28/2023  CT ABDOMEN PELVIS WO CONTRAST Date of Exam: 4/28/2023 5:52 PM EDT Indication: right side abd pain, vomiting, may have a mass. Comparison: None available. Technique: Axial CT images were obtained of the abdomen and pelvis without the administration of contrast. Reconstructed coronal and sagittal images were also obtained. Automated exposure control and iterative construction methods were used. Findings: The lung bases are grossly clear. Unremarkable appearance of the liver, gallbladder, bile ducts, spleen. There is hazy peripancreatic fat stranding with otherwise unremarkable appearance of the pancreas; there is thin nonorganized peripancreatic fluid along the inferior margin of the tail (series 901 image 88). Normal appearance of the adrenal glands, kidneys, ureters, and bladder. Fibroid uterus. There are small bilateral ovarian cysts measuring up to 2.8 cm on the right. There is severe sigmoid colonic diverticulosis without evidence of diverticulitis. Normal appendix and terminal ileum. No evidence of bowel obstruction. There is mild fluid distention of the stomach with fluid likely in the distal esophagus. No abdominopelvic free fluid. No pneumoperitoneum. There is atherosclerosis of the  abdominal aorta without evidence of aneurysm. No lymphadenopathy. The body wall soft tissues are unremarkable. The bones are demineralized without acute or suspicious bony findings.     Impression: Hazy peripancreatic fat stranding and thin nonorganized peripancreatic fluid, findings compatible with acute interstitial pancreatitis and correlate with lipase. Mild fluid distention of the stomach with some fluid in the distal esophagus which predisposes the patient to aspiration. Fibroid uterus. Sigmoid colonic diverticulosis without diverticulitis. Electronically Signed: Joaquin Rubalcava  4/28/2023 6:14 PM EDT  Workstation ID: [id number]    XR Chest 1 View    Result Date: 4/29/2023  EXAM:  XR CHEST 1 VW   DATE: 4/29/2023 3:45 AM HISTORY:   Shortness of air COMPARISON:  None. FINDINGS and     1.  Low lung volumes. 2.  Linear opacity right midlung (scarring, atelectasis) 3.  Question right lung base opacity versus elevated diaphragm. 4.  Heart size is normal. 5.  No acute bony findings. Electronically signed by:  Kathrine Calderon M.D.  4/29/2023 3:03 AM Mountain Time                  Plan for Follow-up of Pending Labs/Results: none    Discharge Details        Discharge Medications      New Medications      Instructions Start Date   losartan 100 MG tablet  Commonly known as: COZAAR   100 mg, Oral, Every 24 Hours Scheduled         Changes to Medications      Instructions Start Date   amLODIPine 10 MG tablet  Commonly known as: NORVASC  What changed:   · medication strength  · how much to take   10 mg, Oral, Daily         Continue These Medications      Instructions Start Date   levothyroxine 75 MCG tablet  Commonly known as: SYNTHROID, LEVOTHROID   75 mcg, Oral, Daily      PARoxetine 10 MG tablet  Commonly known as: PAXIL   10 mg, Oral, Every Morning         Stop These Medications    latanoprost 0.005 % ophthalmic solution  Commonly known as: XALATAN     losartan-hydrochlorothiazide 100-12.5 MG per tablet  Commonly known as:  HYZAAR            Allergies   Allergen Reactions   • Celebrex [Celecoxib] Rash   • Fosamax [Alendronate] Rash   • Keflex [Cephalexin] Rash   • Codeine GI Intolerance         Discharge Disposition:  Home or Self Care    Diet:  Hospital:No active diet order      Activity:      Restrictions or Other Recommendations:  Hold HCTZ       CODE STATUS:    Code Status and Medical Interventions:   Ordered at: 04/28/23 2966     Level Of Support Discussed With:    Patient     Code Status (Patient has no pulse and is not breathing):    No CPR (Do Not Attempt to Resuscitate)     Medical Interventions (Patient has pulse or is breathing):    Full Support     Comments:    DNR/DNI in the event of respiratory and/or cardiac arrest.       No future appointments.    Additional Instructions for the Follow-ups that You Need to Schedule     Ambulatory Referral to Occupational Therapy   As directed      Specialty needed: Evaluate and treat    Follow-up needed: Yes         Ambulatory Referral to Physical Therapy Evaluate and treat; (as tolerated)   As directed      Specialty needed: Evaluate and treat    Weight Bearing Status:  (as tolerated)    Follow-up needed: Yes         Ambulatory Referral to Speech Therapy   As directed      Follow-up needed: Yes         Discharge Follow-up with PCP   As directed       Currently Documented PCP:    Riky Sue DO    PCP Phone Number:    [phone]     Follow Up Details: 1 week with blood pressure log and repeat potassium check                     Janette Perez MD  05/04/23      Time Spent on Discharge:  I spent 40 minutes on this discharge activity which included: face-to-face encounter with the patient, reviewing the data in the system, coordination of the care with the nursing staff as well as consultants, documentation, and entering orders.

## 2023-05-10 ENCOUNTER — READMISSION MANAGEMENT (OUTPATIENT)
Dept: CALL CENTER | Facility: HOSPITAL | Age: 88
End: 2023-05-10
Payer: MEDICARE

## 2023-05-10 NOTE — OUTREACH NOTE
Medical Week 2 Survey    Flowsheet Row Responses   [facility], [place] patient discharged from? [facility]   Does the patient have one of the following disease processes/diagnoses(primary or secondary)? Other   Week 2 attempt successful? Yes   Call start time 1335   General alerts for this patient [facility]    Discharge diagnosis Hypokalemia   Call end time 1336   Is patient permission given to speak with other caregiver? Yes   List who call center can speak with Zak leone    Person spoke with today (if not patient) and relationship Zak leone    Meds reviewed with patient/caregiver? Yes   Is the patient taking all medications as directed (includes completed medication regime)? Yes   Has the patient kept scheduled appointments due by today? N/A   What is the patient's perception of their health status since discharge? Improving   Week 2 Call Completed? Yes   Revoked No further contact(revokes)-requires comment   Graduated/Revoked comments Pt returned to [facility] per son-Son states she is doing well           Aga VARGAS - Registered Nurse